# Patient Record
Sex: FEMALE | Race: OTHER | NOT HISPANIC OR LATINO
[De-identification: names, ages, dates, MRNs, and addresses within clinical notes are randomized per-mention and may not be internally consistent; named-entity substitution may affect disease eponyms.]

---

## 2018-02-01 ENCOUNTER — APPOINTMENT (OUTPATIENT)
Dept: MAMMOGRAPHY | Facility: IMAGING CENTER | Age: 58
End: 2018-02-01

## 2018-10-16 ENCOUNTER — INPATIENT (INPATIENT)
Facility: HOSPITAL | Age: 58
LOS: 1 days | Discharge: HOME | End: 2018-10-18
Attending: INTERNAL MEDICINE | Admitting: INTERNAL MEDICINE
Payer: COMMERCIAL

## 2018-10-16 VITALS
OXYGEN SATURATION: 100 % | RESPIRATION RATE: 18 BRPM | TEMPERATURE: 98 F | SYSTOLIC BLOOD PRESSURE: 180 MMHG | DIASTOLIC BLOOD PRESSURE: 80 MMHG | HEART RATE: 103 BPM

## 2018-10-16 DIAGNOSIS — E11.9 TYPE 2 DIABETES MELLITUS WITHOUT COMPLICATIONS: ICD-10-CM

## 2018-10-16 DIAGNOSIS — R07.9 CHEST PAIN, UNSPECIFIED: ICD-10-CM

## 2018-10-16 DIAGNOSIS — Z98.89 OTHER SPECIFIED POSTPROCEDURAL STATES: Chronic | ICD-10-CM

## 2018-10-16 DIAGNOSIS — E78.00 PURE HYPERCHOLESTEROLEMIA, UNSPECIFIED: ICD-10-CM

## 2018-10-16 DIAGNOSIS — Z29.9 ENCOUNTER FOR PROPHYLACTIC MEASURES, UNSPECIFIED: ICD-10-CM

## 2018-10-16 DIAGNOSIS — I10 ESSENTIAL (PRIMARY) HYPERTENSION: ICD-10-CM

## 2018-10-16 DIAGNOSIS — E03.9 HYPOTHYROIDISM, UNSPECIFIED: ICD-10-CM

## 2018-10-16 LAB
ALBUMIN SERPL ELPH-MCNC: 4.6 G/DL — SIGNIFICANT CHANGE UP (ref 3.3–5)
ALP SERPL-CCNC: 87 U/L — SIGNIFICANT CHANGE UP (ref 40–120)
ALT FLD-CCNC: 11 U/L — SIGNIFICANT CHANGE UP (ref 4–33)
APTT BLD: 25.5 SEC — LOW (ref 27.5–37.4)
AST SERPL-CCNC: 20 U/L — SIGNIFICANT CHANGE UP (ref 4–32)
BASOPHILS # BLD AUTO: 0.03 K/UL — SIGNIFICANT CHANGE UP (ref 0–0.2)
BASOPHILS NFR BLD AUTO: 0.3 % — SIGNIFICANT CHANGE UP (ref 0–2)
BILIRUB SERPL-MCNC: < 0.2 MG/DL — LOW (ref 0.2–1.2)
BUN SERPL-MCNC: 15 MG/DL — SIGNIFICANT CHANGE UP (ref 7–23)
CALCIUM SERPL-MCNC: 9.6 MG/DL — SIGNIFICANT CHANGE UP (ref 8.4–10.5)
CHLORIDE SERPL-SCNC: 101 MMOL/L — SIGNIFICANT CHANGE UP (ref 98–107)
CK MB BLD-MCNC: 2.32 NG/ML — SIGNIFICANT CHANGE UP (ref 1–4.7)
CK SERPL-CCNC: 132 U/L — SIGNIFICANT CHANGE UP (ref 25–170)
CO2 SERPL-SCNC: 23 MMOL/L — SIGNIFICANT CHANGE UP (ref 22–31)
CREAT SERPL-MCNC: 0.63 MG/DL — SIGNIFICANT CHANGE UP (ref 0.5–1.3)
D DIMER BLD IA.RAPID-MCNC: 396 NG/ML — SIGNIFICANT CHANGE UP
EOSINOPHIL # BLD AUTO: 0.34 K/UL — SIGNIFICANT CHANGE UP (ref 0–0.5)
EOSINOPHIL NFR BLD AUTO: 2.9 % — SIGNIFICANT CHANGE UP (ref 0–6)
GLUCOSE SERPL-MCNC: 162 MG/DL — HIGH (ref 70–99)
HCT VFR BLD CALC: 36.8 % — SIGNIFICANT CHANGE UP (ref 34.5–45)
HGB BLD-MCNC: 11.3 G/DL — LOW (ref 11.5–15.5)
IMM GRANULOCYTES # BLD AUTO: 0.04 # — SIGNIFICANT CHANGE UP
IMM GRANULOCYTES NFR BLD AUTO: 0.3 % — SIGNIFICANT CHANGE UP (ref 0–1.5)
INR BLD: 1.2 — HIGH (ref 0.88–1.17)
LYMPHOCYTES # BLD AUTO: 3.58 K/UL — HIGH (ref 1–3.3)
LYMPHOCYTES # BLD AUTO: 30.2 % — SIGNIFICANT CHANGE UP (ref 13–44)
MCHC RBC-ENTMCNC: 25.4 PG — LOW (ref 27–34)
MCHC RBC-ENTMCNC: 30.7 % — LOW (ref 32–36)
MCV RBC AUTO: 82.7 FL — SIGNIFICANT CHANGE UP (ref 80–100)
MONOCYTES # BLD AUTO: 0.6 K/UL — SIGNIFICANT CHANGE UP (ref 0–0.9)
MONOCYTES NFR BLD AUTO: 5.1 % — SIGNIFICANT CHANGE UP (ref 2–14)
NEUTROPHILS # BLD AUTO: 7.26 K/UL — SIGNIFICANT CHANGE UP (ref 1.8–7.4)
NEUTROPHILS NFR BLD AUTO: 61.2 % — SIGNIFICANT CHANGE UP (ref 43–77)
NRBC # FLD: 0 — SIGNIFICANT CHANGE UP
PLATELET # BLD AUTO: 286 K/UL — SIGNIFICANT CHANGE UP (ref 150–400)
PMV BLD: 12.2 FL — SIGNIFICANT CHANGE UP (ref 7–13)
POTASSIUM SERPL-MCNC: 4.4 MMOL/L — SIGNIFICANT CHANGE UP (ref 3.5–5.3)
POTASSIUM SERPL-SCNC: 4.4 MMOL/L — SIGNIFICANT CHANGE UP (ref 3.5–5.3)
PROT SERPL-MCNC: 8.2 G/DL — SIGNIFICANT CHANGE UP (ref 6–8.3)
PROTHROM AB SERPL-ACNC: 13.9 SEC — HIGH (ref 9.8–13.1)
RBC # BLD: 4.45 M/UL — SIGNIFICANT CHANGE UP (ref 3.8–5.2)
RBC # FLD: 14.1 % — SIGNIFICANT CHANGE UP (ref 10.3–14.5)
SODIUM SERPL-SCNC: 140 MMOL/L — SIGNIFICANT CHANGE UP (ref 135–145)
TROPONIN T, HIGH SENSITIVITY: < 6 NG/L — SIGNIFICANT CHANGE UP (ref ?–14)
WBC # BLD: 11.85 K/UL — HIGH (ref 3.8–10.5)
WBC # FLD AUTO: 11.85 K/UL — HIGH (ref 3.8–10.5)

## 2018-10-16 PROCEDURE — 71046 X-RAY EXAM CHEST 2 VIEWS: CPT | Mod: 26

## 2018-10-16 RX ORDER — INSULIN LISPRO 100/ML
VIAL (ML) SUBCUTANEOUS AT BEDTIME
Qty: 0 | Refills: 0 | Status: DISCONTINUED | OUTPATIENT
Start: 2018-10-16 | End: 2018-10-18

## 2018-10-16 RX ORDER — DEXTROSE 50 % IN WATER 50 %
25 SYRINGE (ML) INTRAVENOUS ONCE
Qty: 0 | Refills: 0 | Status: DISCONTINUED | OUTPATIENT
Start: 2018-10-16 | End: 2018-10-18

## 2018-10-16 RX ORDER — LEVOTHYROXINE SODIUM 125 MCG
75 TABLET ORAL DAILY
Qty: 0 | Refills: 0 | Status: DISCONTINUED | OUTPATIENT
Start: 2018-10-16 | End: 2018-10-18

## 2018-10-16 RX ORDER — DEXTROSE 50 % IN WATER 50 %
12.5 SYRINGE (ML) INTRAVENOUS ONCE
Qty: 0 | Refills: 0 | Status: DISCONTINUED | OUTPATIENT
Start: 2018-10-16 | End: 2018-10-18

## 2018-10-16 RX ORDER — ASPIRIN/CALCIUM CARB/MAGNESIUM 324 MG
81 TABLET ORAL DAILY
Qty: 0 | Refills: 0 | Status: DISCONTINUED | OUTPATIENT
Start: 2018-10-16 | End: 2018-10-18

## 2018-10-16 RX ORDER — LOSARTAN POTASSIUM 100 MG/1
25 TABLET, FILM COATED ORAL DAILY
Qty: 0 | Refills: 0 | Status: DISCONTINUED | OUTPATIENT
Start: 2018-10-16 | End: 2018-10-18

## 2018-10-16 RX ORDER — HEPARIN SODIUM 5000 [USP'U]/ML
5000 INJECTION INTRAVENOUS; SUBCUTANEOUS EVERY 12 HOURS
Qty: 0 | Refills: 0 | Status: DISCONTINUED | OUTPATIENT
Start: 2018-10-16 | End: 2018-10-18

## 2018-10-16 RX ORDER — DEXTROSE 50 % IN WATER 50 %
15 SYRINGE (ML) INTRAVENOUS ONCE
Qty: 0 | Refills: 0 | Status: DISCONTINUED | OUTPATIENT
Start: 2018-10-16 | End: 2018-10-18

## 2018-10-16 RX ORDER — INSULIN LISPRO 100/ML
VIAL (ML) SUBCUTANEOUS
Qty: 0 | Refills: 0 | Status: DISCONTINUED | OUTPATIENT
Start: 2018-10-16 | End: 2018-10-18

## 2018-10-16 RX ORDER — SODIUM CHLORIDE 9 MG/ML
1000 INJECTION, SOLUTION INTRAVENOUS ONCE
Qty: 0 | Refills: 0 | Status: COMPLETED | OUTPATIENT
Start: 2018-10-16 | End: 2018-10-16

## 2018-10-16 RX ORDER — ASPIRIN/CALCIUM CARB/MAGNESIUM 324 MG
162 TABLET ORAL ONCE
Qty: 0 | Refills: 0 | Status: COMPLETED | OUTPATIENT
Start: 2018-10-16 | End: 2018-10-16

## 2018-10-16 RX ORDER — GLUCAGON INJECTION, SOLUTION 0.5 MG/.1ML
1 INJECTION, SOLUTION SUBCUTANEOUS ONCE
Qty: 0 | Refills: 0 | Status: DISCONTINUED | OUTPATIENT
Start: 2018-10-16 | End: 2018-10-18

## 2018-10-16 RX ORDER — ATORVASTATIN CALCIUM 80 MG/1
10 TABLET, FILM COATED ORAL AT BEDTIME
Qty: 0 | Refills: 0 | Status: DISCONTINUED | OUTPATIENT
Start: 2018-10-16 | End: 2018-10-18

## 2018-10-16 RX ORDER — INSULIN GLARGINE 100 [IU]/ML
20 INJECTION, SOLUTION SUBCUTANEOUS AT BEDTIME
Qty: 0 | Refills: 0 | Status: DISCONTINUED | OUTPATIENT
Start: 2018-10-16 | End: 2018-10-18

## 2018-10-16 RX ORDER — ATENOLOL 25 MG/1
25 TABLET ORAL DAILY
Qty: 0 | Refills: 0 | Status: DISCONTINUED | OUTPATIENT
Start: 2018-10-16 | End: 2018-10-18

## 2018-10-16 RX ORDER — SODIUM CHLORIDE 9 MG/ML
1000 INJECTION, SOLUTION INTRAVENOUS
Qty: 0 | Refills: 0 | Status: DISCONTINUED | OUTPATIENT
Start: 2018-10-16 | End: 2018-10-18

## 2018-10-16 RX ORDER — FAMOTIDINE 10 MG/ML
40 INJECTION INTRAVENOUS DAILY
Qty: 0 | Refills: 0 | Status: DISCONTINUED | OUTPATIENT
Start: 2018-10-16 | End: 2018-10-18

## 2018-10-16 RX ADMIN — SODIUM CHLORIDE 1000 MILLILITER(S): 9 INJECTION, SOLUTION INTRAVENOUS at 22:07

## 2018-10-16 RX ADMIN — LOSARTAN POTASSIUM 25 MILLIGRAM(S): 100 TABLET, FILM COATED ORAL at 23:31

## 2018-10-16 RX ADMIN — ATORVASTATIN CALCIUM 10 MILLIGRAM(S): 80 TABLET, FILM COATED ORAL at 23:31

## 2018-10-16 RX ADMIN — ATENOLOL 25 MILLIGRAM(S): 25 TABLET ORAL at 23:31

## 2018-10-16 RX ADMIN — Medication 162 MILLIGRAM(S): at 19:08

## 2018-10-16 RX ADMIN — SODIUM CHLORIDE 1000 MILLILITER(S): 9 INJECTION, SOLUTION INTRAVENOUS at 19:09

## 2018-10-16 NOTE — ED PROVIDER NOTE - PHYSICAL EXAMINATION
gen: well appearing  Mentation: AAO x 3  psych: mood appropriate  ENT: airway patent  Eyes: conjunctivae clear bilaterally  Cardio: RRR, no m/r/g  Resp: normal BS b/l  GI: s/nt/nd   Neuro: AAO x 3, sensation and motor function intact  MSK: normal movement of all extremities

## 2018-10-16 NOTE — H&P ADULT - HISTORY OF PRESENT ILLNESS
59 y/o F with h/o arthritis, DM, HTN, HLD, hypothyroidism, PVD presents to the ED for chest pain X 2-3 days. Pt states chest pain is constant, left sided radiating to the left shoulder, nonexertional, non positional. Pt states the chest pain usually started as a cramping sensation, and now it is a dull sensation. Pt also states the chest pain was initially pleuritic and now it is nonpleuritic. Pt reports drinking 3 cup of tea a day. Pt also has recently had two death in the family and work stress as well. Pt states she went to her PCP office today and did an EKG which was found to be abnormal and was sent here for further evaluation. Pt states last stress test was 3 years ago with normal results. Pt denies LOC, syncope, fever, chills, N/V/D/C, palpitations, shortness of breath, dizziness, lightheadedness dysuria, urinary/bowel incontinence or any other complaints at this time.

## 2018-10-16 NOTE — H&P ADULT - NSHPPHYSICALEXAM_GEN_ALL_CORE
GENERAL APPEARANCE: Well developed, well nourished, alert and cooperative, and appears to be in no acute distress.  HEAD: normocephalic.  EYES: PERRL, EOMI.   EARS: External auditory canals clear, hearing grossly intact.  NECK: Neck supple, non-tender without lymphadenopathy, masses or thyromegaly.  CARDIAC: Normal S1 and S2. No S3, S4 or murmurs. Rhythm is regular.   LUNGS: Clear to auscultation without rales, rhonchi, wheezing or diminished breath sounds.  ABDOMEN: Positive bowel sounds. Soft, nondistended, nontender. No guarding or rebound. No masses.  EXTREMITIES: No significant deformity or joint abnormality. No edema. Peripheral pulses intact.   SKIN: Skin normal color, texture and turgor with no lesions or eruptions.  PSYCHIATRIC: The mental examination revealed the patient was oriented to person, place, and time. The patient was able to demonstrate good judgement and reason, without hallucinations, abnormal affect or abnormal behaviors during the examination. Patient is not suicidal.

## 2018-10-16 NOTE — ED ADULT NURSE NOTE - FAMILY HISTORY
Mother  Still living? No  Family history of myocardial infarction at age less than 60, Age at diagnosis: Age Unknown     Father  Still living? No  Family history of CVA, Age at diagnosis: Age Unknown     Sibling  Still living? No  Family history of myocardial infarction at age less than 60, Age at diagnosis: Age Unknown

## 2018-10-16 NOTE — ED ADULT NURSE NOTE - PMH
Arthritis    Chronic Osteoarthritis    Diabetes Mellitus  x 1 year with neuropathy  HTN (Hypertension)    Hypercholesterolemia    Hypothyroidism    PVD (Peripheral Vascular Disease)

## 2018-10-16 NOTE — H&P ADULT - PROBLEM SELECTOR PLAN 2
Routine blood glucose check.   Stop PO medications, continue with lantus and start sliding scale.   Check hemoglobin a1c.   Low carb diet

## 2018-10-16 NOTE — H&P ADULT - FAMILY HISTORY
Family history of myocardial infarction at age less than 60, mother  at 45 from MI     Family history of CVA, father  at 57 y/o     Sibling  Still living? No  Family history of myocardial infarction at age less than 60, Age at diagnosis: Age Unknown

## 2018-10-16 NOTE — H&P ADULT - PROBLEM SELECTOR PLAN 1
Admit to telemetry.   Trend CE. EKG PRN chest pain.   Plan for cath in AM. NPO after midnight.   check cbc, tsh, lipid, hemoglobin a1c, bmp with mag and phos.  f/u MD note

## 2018-10-16 NOTE — ED ADULT NURSE NOTE - OBJECTIVE STATEMENT
Patient brought into room 2. A&o x 4, clear, equal, unlabored, respirations. Sent by cardiologist for abnormal EKG. C./o chest pain staring 3 days ago with a cramping feeling followed by sharp pain. Patient placed on cardiac monitor.  No edema, JVD, headache, SOB. 20g iv placed in right AC. Labs sent. MD at bedside. Awaiting further orders.

## 2018-10-16 NOTE — ED PROVIDER NOTE - ATTENDING CONTRIBUTION TO CARE
58F c/o chest pain.  EKG – SR at 98 no ana maría no std, TWI V2.  L sided chest pain rad to L arm. Went to PMD Dr Chatterjee, did EKG, was abnl and sent pt here.  Pt to see Dr Feli Nogueira, plan to do angiography.  Last meal 1PM.  No sweating, no SOB.  Was shaking yesterday.   No cough, no fever, no dysuria. No abd pain, no vomiting.  Constant pain.  No change with exertion.  Started as a cramp in her L chest. Initially worse with deep breath.  Took ASA at that time which helped some.  No leg swelling or pain.  General weakness L side.  No rash.  Had ST 3 years ago which was fine.  No h/o DVT/PE.  pMHX – DM on pills and insulin.  HTN, hypothyroid, HLD  PSHX – knee surg 2ya, BTL Meds – metformin bid, insulin, losartan, atenolol, zocor, ASA.  No T.  All – celebrex, aloe vera, yeast.  D/w PMD Shena who wants pt admitted for possible cath tmw.  Rx ASA, check labs, CXR, CE, dimer, admit.   VS:  unremarkable    GEN - NAD; malaise; A+O x3   HEAD - NC/AT     ENT - PEERL, EOMI, mucous membranes  moist , no discharge      NECK: Neck supple, non-tender without lymphadenopathy, no masses, no JVD  PULM - CTA b/l,  symmetric breath sounds  COR -  normal heart sounds    ABD - , ND, NT, soft,  BACK - no CVA tenderness, nontender spine     EXTREMS - no edema, no deformity, warm and well perfused    SKIN - no rash or bruising      NEUROLOGIC - alert, CN 2-12 intact, sensation nl, motor no focal deficit.

## 2018-10-16 NOTE — H&P ADULT - ASSESSMENT
59 y/o F with h/o arthritis, DM, HTN, HLD, hypothyroidism, PVD presents to the ED for L sided chest pain. Admit to telemetry for cath in AM.

## 2018-10-16 NOTE — H&P ADULT - NSHPLABSRESULTS_GEN_ALL_CORE
LABS:                        11.3   11.85 )-----------( 286      ( 16 Oct 2018 18:40 )             36.8     10-16    140  |  101  |  15  ----------------------------<  162<H>  4.4   |  23  |  0.63    Ca    9.6      16 Oct 2018 18:40    TPro  8.2  /  Alb  4.6  /  TBili  < 0.2<L>  /  DBili  x   /  AST  20  /  ALT  11  /  AlkPhos  87  10-16    PT/INR - ( 16 Oct 2018 18:40 )   PT: 13.9 SEC;   INR: 1.20          PTT - ( 16 Oct 2018 18:40 )  PTT:25.5 SEC    CAPILLARY BLOOD GLUCOSE    EKG shows NSR @ 98 bpm TWI in V1,V2  ms

## 2018-10-17 ENCOUNTER — TRANSCRIPTION ENCOUNTER (OUTPATIENT)
Age: 58
End: 2018-10-17

## 2018-10-17 LAB
BUN SERPL-MCNC: 15 MG/DL — SIGNIFICANT CHANGE UP (ref 7–23)
CALCIUM SERPL-MCNC: 8.8 MG/DL — SIGNIFICANT CHANGE UP (ref 8.4–10.5)
CHLORIDE SERPL-SCNC: 106 MMOL/L — SIGNIFICANT CHANGE UP (ref 98–107)
CHOLEST SERPL-MCNC: 156 MG/DL — SIGNIFICANT CHANGE UP (ref 120–199)
CK MB BLD-MCNC: 1.74 NG/ML — SIGNIFICANT CHANGE UP (ref 1–4.7)
CK SERPL-CCNC: 86 U/L — SIGNIFICANT CHANGE UP (ref 25–170)
CO2 SERPL-SCNC: 24 MMOL/L — SIGNIFICANT CHANGE UP (ref 22–31)
CREAT SERPL-MCNC: 0.62 MG/DL — SIGNIFICANT CHANGE UP (ref 0.5–1.3)
GLUCOSE SERPL-MCNC: 110 MG/DL — HIGH (ref 70–99)
HBA1C BLD-MCNC: 7.4 % — HIGH (ref 4–5.6)
HCT VFR BLD CALC: 35.8 % — SIGNIFICANT CHANGE UP (ref 34.5–45)
HDLC SERPL-MCNC: 44 MG/DL — LOW (ref 45–65)
HGB BLD-MCNC: 11.1 G/DL — LOW (ref 11.5–15.5)
LIPID PNL WITH DIRECT LDL SERPL: 94 MG/DL — SIGNIFICANT CHANGE UP
MAGNESIUM SERPL-MCNC: 2 MG/DL — SIGNIFICANT CHANGE UP (ref 1.6–2.6)
MCHC RBC-ENTMCNC: 25.3 PG — LOW (ref 27–34)
MCHC RBC-ENTMCNC: 31 % — LOW (ref 32–36)
MCV RBC AUTO: 81.5 FL — SIGNIFICANT CHANGE UP (ref 80–100)
NRBC # FLD: 0 — SIGNIFICANT CHANGE UP
PHOSPHATE SERPL-MCNC: 3.2 MG/DL — SIGNIFICANT CHANGE UP (ref 2.5–4.5)
PLATELET # BLD AUTO: 261 K/UL — SIGNIFICANT CHANGE UP (ref 150–400)
PMV BLD: 11.5 FL — SIGNIFICANT CHANGE UP (ref 7–13)
POTASSIUM SERPL-MCNC: 4.2 MMOL/L — SIGNIFICANT CHANGE UP (ref 3.5–5.3)
POTASSIUM SERPL-SCNC: 4.2 MMOL/L — SIGNIFICANT CHANGE UP (ref 3.5–5.3)
RBC # BLD: 4.39 M/UL — SIGNIFICANT CHANGE UP (ref 3.8–5.2)
RBC # FLD: 14.2 % — SIGNIFICANT CHANGE UP (ref 10.3–14.5)
SODIUM SERPL-SCNC: 141 MMOL/L — SIGNIFICANT CHANGE UP (ref 135–145)
TRIGL SERPL-MCNC: 142 MG/DL — SIGNIFICANT CHANGE UP (ref 10–149)
TROPONIN T, HIGH SENSITIVITY: < 6 NG/L — SIGNIFICANT CHANGE UP (ref ?–14)
TSH SERPL-MCNC: 4.05 UIU/ML — SIGNIFICANT CHANGE UP (ref 0.27–4.2)
WBC # BLD: 7.97 K/UL — SIGNIFICANT CHANGE UP (ref 3.8–10.5)
WBC # FLD AUTO: 7.97 K/UL — SIGNIFICANT CHANGE UP (ref 3.8–10.5)

## 2018-10-17 PROCEDURE — 93454 CORONARY ARTERY ANGIO S&I: CPT | Mod: 26

## 2018-10-17 PROCEDURE — 99152 MOD SED SAME PHYS/QHP 5/>YRS: CPT

## 2018-10-17 RX ORDER — ACETAMINOPHEN 500 MG
650 TABLET ORAL ONCE
Qty: 0 | Refills: 0 | Status: COMPLETED | OUTPATIENT
Start: 2018-10-17 | End: 2018-10-17

## 2018-10-17 RX ADMIN — Medication 650 MILLIGRAM(S): at 23:23

## 2018-10-17 RX ADMIN — FAMOTIDINE 40 MILLIGRAM(S): 10 INJECTION INTRAVENOUS at 13:13

## 2018-10-17 RX ADMIN — Medication 81 MILLIGRAM(S): at 13:13

## 2018-10-17 RX ADMIN — ATORVASTATIN CALCIUM 10 MILLIGRAM(S): 80 TABLET, FILM COATED ORAL at 21:22

## 2018-10-17 RX ADMIN — LOSARTAN POTASSIUM 25 MILLIGRAM(S): 100 TABLET, FILM COATED ORAL at 21:22

## 2018-10-17 RX ADMIN — Medication 650 MILLIGRAM(S): at 22:23

## 2018-10-17 RX ADMIN — Medication 75 MICROGRAM(S): at 05:58

## 2018-10-17 RX ADMIN — HEPARIN SODIUM 5000 UNIT(S): 5000 INJECTION INTRAVENOUS; SUBCUTANEOUS at 05:58

## 2018-10-17 RX ADMIN — ATENOLOL 25 MILLIGRAM(S): 25 TABLET ORAL at 21:22

## 2018-10-17 RX ADMIN — INSULIN GLARGINE 20 UNIT(S): 100 INJECTION, SOLUTION SUBCUTANEOUS at 00:30

## 2018-10-17 RX ADMIN — Medication 1: at 22:03

## 2018-10-17 RX ADMIN — INSULIN GLARGINE 20 UNIT(S): 100 INJECTION, SOLUTION SUBCUTANEOUS at 22:00

## 2018-10-17 NOTE — DISCHARGE NOTE ADULT - MEDICATION SUMMARY - MEDICATIONS TO TAKE
I will START or STAY ON the medications listed below when I get home from the hospital:    aspirin 81 mg oral tablet  -- 1 tab(s) by mouth once a day  -- Indication: For Cardioprotective    losartan 25 mg oral tablet  -- 1 tab(s) by mouth once a day  -- Indication: For HTN (Hypertension)    Basaglar KwikPen 100 units/mL subcutaneous solution  -- 30 unit(s) subcutaneous once a day (at bedtime)  -- Indication: For Diabetes Mellitus    metFORMIN 1000 mg oral tablet  -- 1 tab(s) by mouth 2 times a day, REstart ONLY  on 10/20  -- Indication: For Diabetes Mellitus    atorvastatin 10 mg oral tablet  -- 1 tab(s) by mouth once a day  -- Indication: For Cholesterol    atenolol 25 mg oral tablet  -- 1 tab(s) by mouth once a day  -- Indication: For HTN (Hypertension)    famotidine 40 mg oral tablet  -- 1 tab(s) by mouth once a day  -- Indication: For GERD    levothyroxine 75 mcg (0.075 mg) oral tablet  -- 1 tab(s) by mouth once a day  -- Indication: For Hypothyroidism

## 2018-10-17 NOTE — DISCHARGE NOTE ADULT - PATIENT PORTAL LINK FT
You can access the [facility] Patient Portal, offered by [facility], by registering with the following website: [email or web address]

## 2018-10-17 NOTE — DISCHARGE NOTE ADULT - CARE PROVIDER_API CALL
Lisa Chatterjee), Internal Medicine  [address] [place] [address]  Phone: [phone]  Fax: [phone]

## 2018-10-17 NOTE — DISCHARGE NOTE ADULT - CARE PLAN
Principal Discharge DX:	Chest pain  Goal:	REsolved  Assessment and plan of treatment:	Follow up with Dr. Chatterjee  Secondary Diagnosis:	Diabetes Mellitus  Goal:	To maintain a normal blood glucose level and HgA1C level < 5.7 and to prevent diabetic complications such as uncontrolled diabetes, diabetic coma, blindness, renal failure, and amputations.  Assessment and plan of treatment:	Monitor finger sticks pre-meal and bedtime, low salt, fat and carbohydrate diet, minimize glucose intake.  Exercise daily for at least 30 minutes and weight loss.  Follow up with primary care physician and endocrinologist for routine Hemoglobin A1C checks and management.  Follow up with your ophthalmologist for routine yearly vision exams.  Secondary Diagnosis:	HTN (Hypertension)  Goal:	To maintain a normal blood pressure to prevent heart attack, stroke and renal failure.  Assessment and plan of treatment:	Low sodium and fat diet, continue anti-hypertensive medications, and follow up with primary care physician.  Secondary Diagnosis:	Hypothyroidism  Goal:	To maintain a normal TSH Level.  Secondary Diagnosis:	Hypercholesterolemia  Goal:	To maintain normal cholesterol levels to prevent stroke, coronary artery disease, peripheral vascular disease and heart attacks.  Assessment and plan of treatment:	Low fat diet, exercise daily and continue current medications. Follow up with primary care physician and cardiologist for management.

## 2018-10-17 NOTE — PROGRESS NOTE ADULT - PROBLEM SELECTOR PLAN 2
ISS  Stop PO medications, continue with lantus and start sliding scale.   Check hemoglobin a1c.   Low carb diet

## 2018-10-17 NOTE — DISCHARGE NOTE ADULT - HOSPITAL COURSE
58F with h/o arthritis, DM, HTN, HLD, hypothyroidism, PVD presents to the ED for chest pain X 2-3 days.  Negative hsTrop x 2  ECG with nonspecific findings  Plan for Cath: _______________________________    #HTN  -- continue atenolol  -- continue lisinopril    #HLD  -- continue atorvastatin 58F with h/o arthritis, DM, HTN, HLD, hypothyroidism, PVD presents to the ED for chest pain X 2-3 days.  Negative hsTrop x 2  ECG with nonspecific findings  Plan for Cath: Normal Coronaries    #HTN  -- continue atenolol  -- continue lisinopril    #HLD  -- continue atorvastatin  Case discussed with attending, Pt is stable for Discharge Home.

## 2018-10-17 NOTE — CONSULT NOTE ADULT - ASSESSMENT
58F with h/o arthritis, DM, HTN, HLD, hypothyroidism, PVD presents to the ED for chest pain X 2-3 days.  Negative hsTrop x 2  ECG with nonspecific findings    #Atypical chest pain  -- continue asa, atorvastatin  -- check TTE  -- would proceed with nuclear stress test    #HTN  -- continue atenolol  -- continue lisinopril    #HLD  -- continue atorvastatin    Zeke Woodward MD

## 2018-10-17 NOTE — CONSULT NOTE ADULT - SUBJECTIVE AND OBJECTIVE BOX
Patient seen and evaluated @ 9:45 AM  Chief Complaint: Chest pain     HPI:  58F with h/o arthritis, DM, HTN, HLD, hypothyroidism, PVD presents to the ED for chest pain X 2-3 days. Pt states chest pain is constant, left sided radiating to the left shoulder, nonexertional, non positional. Pt states the chest pain usually started as a cramping sensation, and now it is a dull sensation. Pt also states the chest pain was initially pleuritic and now it is nonpleuritic. Pt reports drinking 3 cup of tea a day. Pt also has recently had two death in the family and work stress as well. Pt states she went to her PCP office today and did an EKG which was found to be abnormal and was sent here for further evaluation. Pt states last stress test was 3 years ago with normal results. Pt denies LOC, syncope, fever, chills, N/V/D/C, palpitations, shortness of breath, dizziness, lightheadedness dysuria, urinary/bowel incontinence or any other complaints at this time.    Trop < 6 x 2. No significant ischemic changes on ECG.    Prior cath in , normal cors but concern for Takotsubo's.    PMH:   Arthritis  Chronic Osteoarthritis  Hypothyroidism  Diabetes Mellitus  Hypercholesterolemia  HTN (Hypertension)  PVD (Peripheral Vascular Disease)    PSH:   H/O  section  Tubal Ligation Status  Knee Replacement    Medications:   aspirin  chewable 81 milliGRAM(s) Oral daily  ATENolol  Tablet 25 milliGRAM(s) Oral daily  atorvastatin 10 milliGRAM(s) Oral at bedtime  dextrose 40% Gel 15 Gram(s) Oral once PRN  dextrose 5%. 1000 milliLiter(s) IV Continuous <Continuous>  dextrose 50% Injectable 12.5 Gram(s) IV Push once  dextrose 50% Injectable 25 Gram(s) IV Push once  dextrose 50% Injectable 25 Gram(s) IV Push once  famotidine    Tablet 40 milliGRAM(s) Oral daily  glucagon  Injectable 1 milliGRAM(s) IntraMuscular once PRN  heparin  Injectable 5000 Unit(s) SubCutaneous every 12 hours  insulin glargine Injectable (LANTUS) 20 Unit(s) SubCutaneous at bedtime  insulin lispro (HumaLOG) corrective regimen sliding scale   SubCutaneous three times a day before meals  insulin lispro (HumaLOG) corrective regimen sliding scale   SubCutaneous at bedtime  levothyroxine 75 MICROGram(s) Oral daily  losartan 25 milliGRAM(s) Oral daily    Allergies:  adhesives (Rash)  aloe vera (Swelling)  baking soda (Hives)  Celebrex (Swelling)  sulfa drugs (Unknown)    FAMILY HISTORY:  Family history of myocardial infarction at age less than 60 (Sibling): brother  at 56 y/o from MI  Family history of CVA: father  at 59 y/o  Family history of myocardial infarction at age less than 60: mother  at 45 from MI    Social History:  [place]    Review of Systems:  Constitutional: [ ] Fever [ ] Chills [ ] Fatigue [ ] Weight change   HEENT: [ ] Blurred vision [ ] Eye Pain [ ] Headache [ ] Runny nose [ ] Sore Throat   Respiratory: [ ] Cough [ ] Wheezing [ ] Shortness of breath  Cardiovascular: [ ] Chest Pain [ ] Palpitations [ ] VAUGHN [ ] PND [ ] Orthopnea  Gastrointestinal: [ ] Abdominal Pain [ ] Diarrhea [ ] Constipation [ ] Hemorrhoids [ ] Nausea [ ] Vomiting  Genitourinary: [ ] Nocturia [ ] Dysuria [ ] Incontinence  Extremities: [ ] Swelling [ ] Joint Pain  Neurologic: [ ] Focal deficit [ ] Paresthesias [ ] Syncope  Lymphatic: [ ] Swelling [ ] Lymphadenopathy   Skin: [ ] Rash [ ] Ecchymoses [ ] Wounds [ ] Lesions  Psychiatry: [ ] Depression [ ] Suicidal/Homicidal Ideation [ ] Anxiety [ ] Sleep Disturbances  [x] 10 point review of systems is otherwise negative except as mentioned above            [ ]Unable to obtain    Physical Exam:  T(C): 36.7 (10-17-18 @ 05:57), Max: 36.8 (10-16-18 @ 16:20)  HR: 64 (10-17-18 @ 05:57) (64 - 103)  BP: 114/61 (10-17-18 @ 05:57) (114/61 - 180/80)  RR: 18 (10-17-18 @ 05:57) (16 - 18)  SpO2: 100% (10-17-18 @ 05:57) (99% - 100%)  Wt(kg): --    Daily Height in cm: 154.94 (16 Oct 2018 22:58)    Daily     Appearance: NAD  Eyes: PERRL, EOMI  HENT: Normal oral muscosa, NC/AT  Cardiovascular: normal S1 and S2, RRR, no m/r/g, no edema, normal JVP  Respiratory: Clear to auscultation bilaterally  Gastrointestinal: Soft, non-tender, non-distended, BS+  Musculoskeletal: No clubbing, no joint deformity   Neurologic: Non-focal  Lymphatic: No lymphadenopathy  Psychiatry: AAOx3, mood & affect appropriate  Skin: No rashes, no ecchymoses, no cyanosis    Cardiovascular Diagnostic Testing:  ECG: NSR, nonspecific ST-T wave changes    Echo:  TTE   Conclusions:  1. Endocardium not well visualized; grossly normal left  ventricular function.  Normal LV internal dimensions and  wall thicknesses.  Endocardial visualization enhanced with  intravenous injection of echo contrast (Definity).  2. Normal right ventricular size and systolic function.  3. Minimal mitral regurgitation.    Stress Testing:  none    Cath:    Ventricles: There was no diagnostic evidence of left ventricular regional  wall motion abnormalities. Analysis of regional contractile function  demonstrated severe anterolateral hypokinesis. EF estimated by contrast  ventriculography was 45 %. The left ventricle was normal in size.  Valves: Aortic valve: No significant aortic valve gradient. Mitral valve:  The mitral valve exhibited no regurgitation.  Coronary vessels: The coronary circulation is right dominant. There was no  angiographic evidence for occlusive coronary artery disease.  LM:      LM: The vessel was medium sized. Angiography showed no evidence of  disease.  LAD:      LAD: The vessel was medium to large sized. Angiography showed no  evidence of disease.  CX:      Circumflex: The vessel was medium sized. Angiography showed no  evidence of disease.  RCA:      RCA: The vessel was medium to large sized. Angiography showed no  evidence of disease.  Aorta: The root exhibited normal size.  Complications: There were no complications.  Summary:  Coronary circulation: There was no angiographic evidence for occlusive  coronary artery disease.  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    Interpretation of Telemetry: SR 60    Imaging:  CXR  IMPRESSION:   Clear lungs.    Labs:                        11.1   7.97  )-----------( 261      ( 17 Oct 2018 07:20 )             35.8     10-17    141  |  106  |  15  ----------------------------<  110<H>  4.2   |  24  |  0.62    Ca    8.8      17 Oct 2018 04:59  Phos  3.2     10-17  Mg     2.0     10-17    TPro  8.2  /  Alb  4.6  /  TBili  < 0.2<L>  /  DBili  x   /  AST  20  /  ALT  11  /  AlkPhos  87  10-16    PT/INR - ( 16 Oct 2018 18:40 )   PT: 13.9 SEC;   INR: 1.20          PTT - ( 16 Oct 2018 18:40 )  PTT:25.5 SEC  CARDIAC MARKERS ( 17 Oct 2018 04:59 )  x     / x     / 86 u/L / 1.74 ng/mL / x      CARDIAC MARKERS ( 16 Oct 2018 18:40 )  x     / x     / 132 u/L / 2.32 ng/mL / x            Total Cholesterol: 156  LDL: 94  HDL: 44  T    Hemoglobin A1C, Whole Blood: 7.4 % (10-17 @ 07:20)    Thyroid Stimulating Hormone, Serum: 4.05 uIU/mL (10-17 @ 04:59)

## 2018-10-17 NOTE — DISCHARGE NOTE ADULT - PLAN OF CARE
REsolved Follow up with Dr. Chatterjee To maintain a normal blood glucose level and HgA1C level < 5.7 and to prevent diabetic complications such as uncontrolled diabetes, diabetic coma, blindness, renal failure, and amputations. Monitor finger sticks pre-meal and bedtime, low salt, fat and carbohydrate diet, minimize glucose intake.  Exercise daily for at least 30 minutes and weight loss.  Follow up with primary care physician and endocrinologist for routine Hemoglobin A1C checks and management.  Follow up with your ophthalmologist for routine yearly vision exams. To maintain a normal blood pressure to prevent heart attack, stroke and renal failure. Low sodium and fat diet, continue anti-hypertensive medications, and follow up with primary care physician. To maintain a normal TSH Level. To maintain normal cholesterol levels to prevent stroke, coronary artery disease, peripheral vascular disease and heart attacks. Low fat diet, exercise daily and continue current medications. Follow up with primary care physician and cardiologist for management.

## 2018-10-18 VITALS
OXYGEN SATURATION: 98 % | TEMPERATURE: 98 F | RESPIRATION RATE: 16 BRPM | SYSTOLIC BLOOD PRESSURE: 132 MMHG | DIASTOLIC BLOOD PRESSURE: 77 MMHG | HEART RATE: 65 BPM

## 2018-10-18 LAB
BASOPHILS # BLD AUTO: 0.03 K/UL — SIGNIFICANT CHANGE UP (ref 0–0.2)
BASOPHILS NFR BLD AUTO: 0.4 % — SIGNIFICANT CHANGE UP (ref 0–2)
BUN SERPL-MCNC: 23 MG/DL — SIGNIFICANT CHANGE UP (ref 7–23)
CALCIUM SERPL-MCNC: 9 MG/DL — SIGNIFICANT CHANGE UP (ref 8.4–10.5)
CHLORIDE SERPL-SCNC: 103 MMOL/L — SIGNIFICANT CHANGE UP (ref 98–107)
CO2 SERPL-SCNC: 23 MMOL/L — SIGNIFICANT CHANGE UP (ref 22–31)
CREAT SERPL-MCNC: 0.68 MG/DL — SIGNIFICANT CHANGE UP (ref 0.5–1.3)
EOSINOPHIL # BLD AUTO: 0.32 K/UL — SIGNIFICANT CHANGE UP (ref 0–0.5)
EOSINOPHIL NFR BLD AUTO: 3.8 % — SIGNIFICANT CHANGE UP (ref 0–6)
GLUCOSE SERPL-MCNC: 127 MG/DL — HIGH (ref 70–99)
HCT VFR BLD CALC: 36.3 % — SIGNIFICANT CHANGE UP (ref 34.5–45)
HGB BLD-MCNC: 11.4 G/DL — LOW (ref 11.5–15.5)
IMM GRANULOCYTES # BLD AUTO: 0.01 # — SIGNIFICANT CHANGE UP
IMM GRANULOCYTES NFR BLD AUTO: 0.1 % — SIGNIFICANT CHANGE UP (ref 0–1.5)
LYMPHOCYTES # BLD AUTO: 3.93 K/UL — HIGH (ref 1–3.3)
LYMPHOCYTES # BLD AUTO: 46.1 % — HIGH (ref 13–44)
MAGNESIUM SERPL-MCNC: 2.1 MG/DL — SIGNIFICANT CHANGE UP (ref 1.6–2.6)
MCHC RBC-ENTMCNC: 25.8 PG — LOW (ref 27–34)
MCHC RBC-ENTMCNC: 31.4 % — LOW (ref 32–36)
MCV RBC AUTO: 82.1 FL — SIGNIFICANT CHANGE UP (ref 80–100)
MONOCYTES # BLD AUTO: 0.49 K/UL — SIGNIFICANT CHANGE UP (ref 0–0.9)
MONOCYTES NFR BLD AUTO: 5.7 % — SIGNIFICANT CHANGE UP (ref 2–14)
NEUTROPHILS # BLD AUTO: 3.75 K/UL — SIGNIFICANT CHANGE UP (ref 1.8–7.4)
NEUTROPHILS NFR BLD AUTO: 43.9 % — SIGNIFICANT CHANGE UP (ref 43–77)
NRBC # FLD: 0 — SIGNIFICANT CHANGE UP
PLATELET # BLD AUTO: 283 K/UL — SIGNIFICANT CHANGE UP (ref 150–400)
PMV BLD: 12.3 FL — SIGNIFICANT CHANGE UP (ref 7–13)
POTASSIUM SERPL-MCNC: 4.1 MMOL/L — SIGNIFICANT CHANGE UP (ref 3.5–5.3)
POTASSIUM SERPL-SCNC: 4.1 MMOL/L — SIGNIFICANT CHANGE UP (ref 3.5–5.3)
RBC # BLD: 4.42 M/UL — SIGNIFICANT CHANGE UP (ref 3.8–5.2)
RBC # FLD: 14.4 % — SIGNIFICANT CHANGE UP (ref 10.3–14.5)
SODIUM SERPL-SCNC: 139 MMOL/L — SIGNIFICANT CHANGE UP (ref 135–145)
WBC # BLD: 8.53 K/UL — SIGNIFICANT CHANGE UP (ref 3.8–10.5)
WBC # FLD AUTO: 8.53 K/UL — SIGNIFICANT CHANGE UP (ref 3.8–10.5)

## 2018-10-18 RX ORDER — ACETAMINOPHEN 500 MG
650 TABLET ORAL EVERY 6 HOURS
Qty: 0 | Refills: 0 | Status: DISCONTINUED | OUTPATIENT
Start: 2018-10-18 | End: 2018-10-18

## 2018-10-18 RX ADMIN — HEPARIN SODIUM 5000 UNIT(S): 5000 INJECTION INTRAVENOUS; SUBCUTANEOUS at 05:09

## 2018-10-18 RX ADMIN — Medication 1: at 13:33

## 2018-10-18 RX ADMIN — Medication 81 MILLIGRAM(S): at 11:14

## 2018-10-18 RX ADMIN — Medication 650 MILLIGRAM(S): at 11:54

## 2018-10-18 RX ADMIN — Medication 75 MICROGRAM(S): at 05:09

## 2018-10-18 RX ADMIN — Medication 650 MILLIGRAM(S): at 11:03

## 2018-10-18 RX ADMIN — FAMOTIDINE 40 MILLIGRAM(S): 10 INJECTION INTRAVENOUS at 11:10

## 2018-10-18 NOTE — PROGRESS NOTE ADULT - SUBJECTIVE AND OBJECTIVE BOX
Cardiology Progress Note    Interval events: S/P LHC with normal coronaries. LRA access site cdi, forearm swelling minimally tender. No ecchymoses.    Tele: SR    Medications:  aspirin  chewable 81 milliGRAM(s) Oral daily  ATENolol  Tablet 25 milliGRAM(s) Oral daily  atorvastatin 10 milliGRAM(s) Oral at bedtime  dextrose 40% Gel 15 Gram(s) Oral once PRN  dextrose 5%. 1000 milliLiter(s) IV Continuous <Continuous>  dextrose 50% Injectable 12.5 Gram(s) IV Push once  dextrose 50% Injectable 25 Gram(s) IV Push once  dextrose 50% Injectable 25 Gram(s) IV Push once  famotidine    Tablet 40 milliGRAM(s) Oral daily  glucagon  Injectable 1 milliGRAM(s) IntraMuscular once PRN  heparin  Injectable 5000 Unit(s) SubCutaneous every 12 hours  insulin glargine Injectable (LANTUS) 20 Unit(s) SubCutaneous at bedtime  insulin lispro (HumaLOG) corrective regimen sliding scale   SubCutaneous three times a day before meals  insulin lispro (HumaLOG) corrective regimen sliding scale   SubCutaneous at bedtime  levothyroxine 75 MICROGram(s) Oral daily  losartan 25 milliGRAM(s) Oral daily    Review of Systems:  Constitutional: [ ] Fever [ ] Chills [ ] Fatigue [ ] Weight change   HEENT: [ ] Blurred vision [ ] Eye Pain [ ] Headache [ ] Runny nose [ ] Sore Throat   Respiratory: [ ] Cough [ ] Wheezing [ ] Shortness of breath  Cardiovascular: [ ] Chest Pain [ ] Palpitations [ ] VAUGHN [ ] PND [ ] Orthopnea  Gastrointestinal: [ ] Abdominal Pain [ ] Diarrhea [ ] Constipation [ ] Hemorrhoids [ ] Nausea [ ] Vomiting  Genitourinary: [ ] Nocturia [ ] Dysuria [ ] Incontinence  Extremities: [ ] Swelling [ ] Joint Pain  Neurologic: [ ] Focal deficit [ ] Paresthesias [ ] Syncope  Lymphatic: [ ] Swelling [ ] Lymphadenopathy   Skin: [ ] Rash [ ] Ecchymoses [ ] Wounds [ ] Lesions  Psychiatry: [ ] Depression [ ] Suicidal/Homicidal Ideation [ ] Anxiety [ ] Sleep Disturbances  [x] 10 point review of systems is otherwise negative except as mentioned above            [ ]Unable to obtain    Vitals:  T(C): 36.8 (10-18-18 @ 05:07), Max: 36.8 (10-17-18 @ 15:07)  HR: 74 (10-18-18 @ 05:07) (69 - 74)  BP: 118/70 (10-18-18 @ 05:07) (118/70 - 148/87)  BP(mean): --  RR: 17 (10-18-18 @ 05:07) (16 - 18)  SpO2: 99% (10-18-18 @ 05:07) (98% - 99%)  Wt(kg): --  Daily     Daily   I&O's Summary      Physical Exam:  Appearance: NAD  Eyes: PERRL, EOMI  HENT: Normal oral muscosa, NC/AT  Cardiovascular: normal S1 and S2, RRR, no m/r/g, no edema, normal JVP  Respiratory: Clear to auscultation bilaterally  Gastrointestinal: Soft, non-tender, non-distended, BS+  Musculoskeletal: No clubbing, no joint deformity   Neurologic: Non-focal  Lymphatic: No lymphadenopathy  Psychiatry: AAOx3, mood & affect appropriate  Skin: No rashes, no ecchymoses, no cyanosis  Procedural site: LRA cdi, swelling of forearm, no ecchymoses, intact peripheral pulses and neuro exam    Labs:                        11.1   7.97  )-----------( 261      ( 17 Oct 2018 07:20 )             35.8     10-17    141  |  106  |  15  ----------------------------<  110<H>  4.2   |  24  |  0.62    Ca    8.8      17 Oct 2018 04:59  Phos  3.2     10-17  Mg     2.0     10-17    TPro  8.2  /  Alb  4.6  /  TBili  < 0.2<L>  /  DBili  x   /  AST  20  /  ALT  11  /  AlkPhos  87  10-16    PT/INR - ( 16 Oct 2018 18:40 )   PT: 13.9 SEC;   INR: 1.20          PTT - ( 16 Oct 2018 18:40 )  PTT:25.5 SEC  CARDIAC MARKERS ( 17 Oct 2018 04:59 )  x     / x     / 86 u/L / 1.74 ng/mL / x      CARDIAC MARKERS ( 16 Oct 2018 18:40 )  x     / x     / 132 u/L / 2.32 ng/mL / x        Hemoglobin A1C, Whole Blood: 7.4 % (10-17 @ 07:20)    New results/imaging:  Cath  VENTRICLES: No left ventriculogram was performed.  CORONARY VESSELS: The coronary circulation is right dominant.  LM:   --  LM: Normal.  LAD:   --  LAD: Angiography showed minor luminal irregularities with no  flow limiting lesions.  --  D1: Normal.  CX:   --  Circumflex: Normal.  RI:   --  Ramus intermedius: Normal.  RCA:   --  RCA: Normal.  COMPLICATIONS: There were no complications.  DIAGNOSTIC IMPRESSIONS: There is mild irregularity of the coronary anatomy.  DIAGNOSTIC RECOMMENDATIONS: Medical management  INTERVENTIONALRECOMMENDATIONS: Medical management
Patient is a 58y old  Female who presents with a chief complaint of chest pain (17 Oct 2018 14:36)      SUBJECTIVE / OVERNIGHT EVENTS:  No CP overnight. Trop negative  Review of Systems:   CONSTITUTIONAL: No fever, weight loss, or fatigue  EYES: No eye pain, visual disturbances, or discharge  ENMT:  No difficulty hearing, tinnitus, vertigo; No sinus or throat pain  NECK: No pain or stiffness  BREASTS: No pain, masses, or nipple discharge  RESPIRATORY: No cough, wheezing, chills or hemoptysis; No shortness of breath  CARDIOVASCULAR: No chest pain, palpitations, dizziness, or leg swelling  GASTROINTESTINAL: No abdominal or epigastric pain. No nausea, vomiting, or hematemesis; No diarrhea or constipation. No melena or hematochezia.  GENITOURINARY: No dysuria, frequency, hematuria, or incontinence  NEUROLOGICAL: No headaches, memory loss, loss of strength, numbness, or tremors  SKIN: No itching, burning, rashes, or lesions   LYMPH NODES: No enlarged glands  ENDOCRINE: No heat or cold intolerance; No hair loss  MUSCULOSKELETAL: No joint pain or swelling; No muscle, back, or extremity pain  PSYCHIATRIC: No depression, anxiety, mood swings, or difficulty sleeping  HEME/LYMPH: No easy bruising, or bleeding gums  ALLERY AND IMMUNOLOGIC: No hives or eczema    MEDICATIONS  (STANDING):  aspirin  chewable 81 milliGRAM(s) Oral daily  ATENolol  Tablet 25 milliGRAM(s) Oral daily  atorvastatin 10 milliGRAM(s) Oral at bedtime  dextrose 5%. 1000 milliLiter(s) (50 mL/Hr) IV Continuous <Continuous>  dextrose 50% Injectable 12.5 Gram(s) IV Push once  dextrose 50% Injectable 25 Gram(s) IV Push once  dextrose 50% Injectable 25 Gram(s) IV Push once  famotidine    Tablet 40 milliGRAM(s) Oral daily  heparin  Injectable 5000 Unit(s) SubCutaneous every 12 hours  insulin glargine Injectable (LANTUS) 20 Unit(s) SubCutaneous at bedtime  insulin lispro (HumaLOG) corrective regimen sliding scale   SubCutaneous three times a day before meals  insulin lispro (HumaLOG) corrective regimen sliding scale   SubCutaneous at bedtime  levothyroxine 75 MICROGram(s) Oral daily  losartan 25 milliGRAM(s) Oral daily    MEDICATIONS  (PRN):  dextrose 40% Gel 15 Gram(s) Oral once PRN Blood Glucose LESS THAN 70 milliGRAM(s)/deciliter  glucagon  Injectable 1 milliGRAM(s) IntraMuscular once PRN Glucose LESS THAN 70 milligrams/deciliter      PHYSICAL EXAM:  Vital Signs Last 24 Hrs  T(C): 36.8 (17 Oct 2018 15:07), Max: 36.8 (17 Oct 2018 15:07)  T(F): 98.2 (17 Oct 2018 15:07), Max: 98.2 (17 Oct 2018 15:07)  HR: 72 (17 Oct 2018 15:07) (64 - 73)  BP: 139/81 (17 Oct 2018 15:07) (114/61 - 158/81)  BP(mean): --  RR: 16 (17 Oct 2018 15:07) (16 - 18)  SpO2: 98% (17 Oct 2018 15:07) (98% - 100%)  I&O's Summary    GENERAL: NAD, well-developed  HEAD:  Atraumatic, Normocephalic  EYES: EOMI, PERRLA, conjunctiva and sclera clear  NECK: Supple, No JVD  CHEST/LUNG: Clear to auscultation bilaterally; No wheeze  HEART: Regular rate and rhythm; No murmurs, rubs, or gallops  ABDOMEN: Soft, Nontender, Nondistended; Bowel sounds present  EXTREMITIES:  2+ Peripheral Pulses, No clubbing, cyanosis, or edema  PSYCH: AAOx3  NEUROLOGY: non-focal  SKIN: No rashes or lesions    LABS:  CAPILLARY BLOOD GLUCOSE      POCT Blood Glucose.: 107 mg/dL (17 Oct 2018 19:27)  POCT Blood Glucose.: 79 mg/dL (17 Oct 2018 17:13)  POCT Blood Glucose.: 88 mg/dL (17 Oct 2018 13:10)  POCT Blood Glucose.: 100 mg/dL (17 Oct 2018 08:51)  POCT Blood Glucose.: 81 mg/dL (16 Oct 2018 22:13)                          11.1   7.97  )-----------( 261      ( 17 Oct 2018 07:20 )             35.8     10-17    141  |  106  |  15  ----------------------------<  110<H>  4.2   |  24  |  0.62    Ca    8.8      17 Oct 2018 04:59  Phos  3.2     10-17  Mg     2.0     10-17    TPro  8.2  /  Alb  4.6  /  TBili  < 0.2<L>  /  DBili  x   /  AST  20  /  ALT  11  /  AlkPhos  87  10-16    PT/INR - ( 16 Oct 2018 18:40 )   PT: 13.9 SEC;   INR: 1.20          PTT - ( 16 Oct 2018 18:40 )  PTT:25.5 SEC  CARDIAC MARKERS ( 17 Oct 2018 04:59 )  x     / x     / 86 u/L / 1.74 ng/mL / x      CARDIAC MARKERS ( 16 Oct 2018 18:40 )  x     / x     / 132 u/L / 2.32 ng/mL / x              RADIOLOGY & ADDITIONAL TESTS:    Imaging Personally Reviewed:    Consultant(s) Notes Reviewed:      Care Discussed with Consultants/Other Providers:

## 2018-10-18 NOTE — CHART NOTE - NSCHARTNOTEFT_GEN_A_CORE
Pt sleeping in bed, easily arousable,  s/p CATH today showing normal coronaries, LRA access     ICU Vital Signs Last 24 Hrs  T(C): 36.7 (17 Oct 2018 20:47), Max: 36.8 (17 Oct 2018 15:07)  T(F): 98 (17 Oct 2018 20:47), Max: 98.2 (17 Oct 2018 15:07)  HR: 69 (17 Oct 2018 20:47) (64 - 72)  BP: 148/87 (17 Oct 2018 20:47) (114/61 - 148/87)  BP(mean): --  ABP: --  ABP(mean): --  RR: 18 (17 Oct 2018 20:47) (16 - 18)  SpO2: 98% (17 Oct 2018 20:47) (98% - 100%)    Exam:  Left wrist with clean, dry bandage in place, no tenderness or swelling  Ringers warm to touch and good hand grasp  Continue to monitor

## 2018-10-18 NOTE — PROGRESS NOTE ADULT - ASSESSMENT
58F with h/o arthritis, DM, HTN, HLD, hypothyroidism, PVD presents to the ED for chest pain X 2-3 days.  Negative hsTrop x 2  ECG with nonspecific findings    #Atypical chest pain  -- continue asa, atorvastatin  -- s/p LHC with normal cors    #HTN  -- continue atenolol  -- continue lisinopril    #HLD  -- continue atorvastatin    No further cardiac w/u.    Zeke Woodward MD
57 y/o F with h/o arthritis, DM, HTN, HLD, hypothyroidism, PVD presents to the ED for L sided chest pain. Admit to telemetry for cath in AM.

## 2019-05-24 ENCOUNTER — APPOINTMENT (OUTPATIENT)
Dept: DERMATOLOGY | Facility: CLINIC | Age: 59
End: 2019-05-24
Payer: MEDICAID

## 2019-05-24 PROCEDURE — 99203 OFFICE O/P NEW LOW 30 MIN: CPT

## 2019-08-27 ENCOUNTER — APPOINTMENT (OUTPATIENT)
Dept: DERMATOLOGY | Facility: CLINIC | Age: 59
End: 2019-08-27
Payer: MEDICAID

## 2019-08-27 DIAGNOSIS — H01.9 UNSPECIFIED INFLAMMATION OF EYELID: ICD-10-CM

## 2019-08-27 PROCEDURE — 99213 OFFICE O/P EST LOW 20 MIN: CPT

## 2019-10-01 ENCOUNTER — OUTPATIENT (OUTPATIENT)
Dept: OUTPATIENT SERVICES | Facility: HOSPITAL | Age: 59
LOS: 1 days | End: 2019-10-01
Payer: MEDICAID

## 2019-10-01 DIAGNOSIS — Z98.89 OTHER SPECIFIED POSTPROCEDURAL STATES: Chronic | ICD-10-CM

## 2019-10-01 PROCEDURE — G9001: CPT

## 2019-10-14 ENCOUNTER — INPATIENT (INPATIENT)
Facility: HOSPITAL | Age: 59
LOS: 0 days | Discharge: HOME | End: 2019-10-15
Attending: INTERNAL MEDICINE | Admitting: INTERNAL MEDICINE
Payer: MEDICAID

## 2019-10-14 VITALS
DIASTOLIC BLOOD PRESSURE: 68 MMHG | RESPIRATION RATE: 16 BRPM | SYSTOLIC BLOOD PRESSURE: 171 MMHG | HEART RATE: 74 BPM | OXYGEN SATURATION: 100 % | TEMPERATURE: 98 F

## 2019-10-14 DIAGNOSIS — E78.00 PURE HYPERCHOLESTEROLEMIA, UNSPECIFIED: ICD-10-CM

## 2019-10-14 DIAGNOSIS — D72.829 ELEVATED WHITE BLOOD CELL COUNT, UNSPECIFIED: ICD-10-CM

## 2019-10-14 DIAGNOSIS — R07.9 CHEST PAIN, UNSPECIFIED: ICD-10-CM

## 2019-10-14 DIAGNOSIS — E03.9 HYPOTHYROIDISM, UNSPECIFIED: ICD-10-CM

## 2019-10-14 DIAGNOSIS — I10 ESSENTIAL (PRIMARY) HYPERTENSION: ICD-10-CM

## 2019-10-14 DIAGNOSIS — E11.9 TYPE 2 DIABETES MELLITUS WITHOUT COMPLICATIONS: ICD-10-CM

## 2019-10-14 DIAGNOSIS — Z29.9 ENCOUNTER FOR PROPHYLACTIC MEASURES, UNSPECIFIED: ICD-10-CM

## 2019-10-14 DIAGNOSIS — Z98.89 OTHER SPECIFIED POSTPROCEDURAL STATES: Chronic | ICD-10-CM

## 2019-10-14 DIAGNOSIS — D50.9 IRON DEFICIENCY ANEMIA, UNSPECIFIED: ICD-10-CM

## 2019-10-14 LAB
ALBUMIN SERPL ELPH-MCNC: 4.5 G/DL — SIGNIFICANT CHANGE UP (ref 3.3–5)
ALP SERPL-CCNC: 79 U/L — SIGNIFICANT CHANGE UP (ref 40–120)
ALT FLD-CCNC: 17 U/L — SIGNIFICANT CHANGE UP (ref 4–33)
ANION GAP SERPL CALC-SCNC: 11 MMO/L — SIGNIFICANT CHANGE UP (ref 7–14)
ANION GAP SERPL CALC-SCNC: 11 MMO/L — SIGNIFICANT CHANGE UP (ref 7–14)
AST SERPL-CCNC: 17 U/L — SIGNIFICANT CHANGE UP (ref 4–32)
BASOPHILS # BLD AUTO: 0.03 K/UL — SIGNIFICANT CHANGE UP (ref 0–0.2)
BASOPHILS # BLD AUTO: 0.03 K/UL — SIGNIFICANT CHANGE UP (ref 0–0.2)
BASOPHILS NFR BLD AUTO: 0.3 % — SIGNIFICANT CHANGE UP (ref 0–2)
BASOPHILS NFR BLD AUTO: 0.3 % — SIGNIFICANT CHANGE UP (ref 0–2)
BILIRUB SERPL-MCNC: < 0.2 MG/DL — LOW (ref 0.2–1.2)
BUN SERPL-MCNC: 13 MG/DL — SIGNIFICANT CHANGE UP (ref 7–23)
BUN SERPL-MCNC: 15 MG/DL — SIGNIFICANT CHANGE UP (ref 7–23)
CALCIUM SERPL-MCNC: 9.5 MG/DL — SIGNIFICANT CHANGE UP (ref 8.4–10.5)
CALCIUM SERPL-MCNC: 9.6 MG/DL — SIGNIFICANT CHANGE UP (ref 8.4–10.5)
CHLORIDE SERPL-SCNC: 103 MMOL/L — SIGNIFICANT CHANGE UP (ref 98–107)
CHLORIDE SERPL-SCNC: 104 MMOL/L — SIGNIFICANT CHANGE UP (ref 98–107)
CHOLEST SERPL-MCNC: 176 MG/DL — SIGNIFICANT CHANGE UP (ref 120–199)
CO2 SERPL-SCNC: 25 MMOL/L — SIGNIFICANT CHANGE UP (ref 22–31)
CO2 SERPL-SCNC: 25 MMOL/L — SIGNIFICANT CHANGE UP (ref 22–31)
CREAT SERPL-MCNC: 0.56 MG/DL — SIGNIFICANT CHANGE UP (ref 0.5–1.3)
CREAT SERPL-MCNC: 0.64 MG/DL — SIGNIFICANT CHANGE UP (ref 0.5–1.3)
D DIMER BLD IA.RAPID-MCNC: < 150 NG/ML — SIGNIFICANT CHANGE UP
EOSINOPHIL # BLD AUTO: 0.11 K/UL — SIGNIFICANT CHANGE UP (ref 0–0.5)
EOSINOPHIL # BLD AUTO: 0.16 K/UL — SIGNIFICANT CHANGE UP (ref 0–0.5)
EOSINOPHIL NFR BLD AUTO: 1.2 % — SIGNIFICANT CHANGE UP (ref 0–6)
EOSINOPHIL NFR BLD AUTO: 1.4 % — SIGNIFICANT CHANGE UP (ref 0–6)
FERRITIN SERPL-MCNC: 10.16 NG/ML — LOW (ref 15–150)
GLUCOSE SERPL-MCNC: 84 MG/DL — SIGNIFICANT CHANGE UP (ref 70–99)
GLUCOSE SERPL-MCNC: 92 MG/DL — SIGNIFICANT CHANGE UP (ref 70–99)
HBA1C BLD-MCNC: 7.4 % — HIGH (ref 4–5.6)
HCT VFR BLD CALC: 31.7 % — LOW (ref 34.5–45)
HCT VFR BLD CALC: 33.9 % — LOW (ref 34.5–45)
HDLC SERPL-MCNC: 55 MG/DL — SIGNIFICANT CHANGE UP (ref 45–65)
HGB BLD-MCNC: 9.4 G/DL — LOW (ref 11.5–15.5)
HGB BLD-MCNC: 9.7 G/DL — LOW (ref 11.5–15.5)
IMM GRANULOCYTES NFR BLD AUTO: 0.3 % — SIGNIFICANT CHANGE UP (ref 0–1.5)
IMM GRANULOCYTES NFR BLD AUTO: 0.3 % — SIGNIFICANT CHANGE UP (ref 0–1.5)
IRON SATN MFR SERPL: 23 UG/DL — LOW (ref 30–160)
IRON SATN MFR SERPL: 450 UG/DL — SIGNIFICANT CHANGE UP (ref 140–530)
LIPID PNL WITH DIRECT LDL SERPL: 117 MG/DL — SIGNIFICANT CHANGE UP
LYMPHOCYTES # BLD AUTO: 3.81 K/UL — HIGH (ref 1–3.3)
LYMPHOCYTES # BLD AUTO: 4.7 K/UL — HIGH (ref 1–3.3)
LYMPHOCYTES # BLD AUTO: 40.7 % — SIGNIFICANT CHANGE UP (ref 13–44)
LYMPHOCYTES # BLD AUTO: 41.2 % — SIGNIFICANT CHANGE UP (ref 13–44)
MAGNESIUM SERPL-MCNC: 1.9 MG/DL — SIGNIFICANT CHANGE UP (ref 1.6–2.6)
MCHC RBC-ENTMCNC: 22.5 PG — LOW (ref 27–34)
MCHC RBC-ENTMCNC: 22.7 PG — LOW (ref 27–34)
MCHC RBC-ENTMCNC: 28.6 % — LOW (ref 32–36)
MCHC RBC-ENTMCNC: 29.7 % — LOW (ref 32–36)
MCV RBC AUTO: 76.4 FL — LOW (ref 80–100)
MCV RBC AUTO: 78.7 FL — LOW (ref 80–100)
MONOCYTES # BLD AUTO: 0.46 K/UL — SIGNIFICANT CHANGE UP (ref 0–0.9)
MONOCYTES # BLD AUTO: 0.71 K/UL — SIGNIFICANT CHANGE UP (ref 0–0.9)
MONOCYTES NFR BLD AUTO: 4.9 % — SIGNIFICANT CHANGE UP (ref 2–14)
MONOCYTES NFR BLD AUTO: 6.2 % — SIGNIFICANT CHANGE UP (ref 2–14)
NEUTROPHILS # BLD AUTO: 4.93 K/UL — SIGNIFICANT CHANGE UP (ref 1.8–7.4)
NEUTROPHILS # BLD AUTO: 5.78 K/UL — SIGNIFICANT CHANGE UP (ref 1.8–7.4)
NEUTROPHILS NFR BLD AUTO: 50.6 % — SIGNIFICANT CHANGE UP (ref 43–77)
NEUTROPHILS NFR BLD AUTO: 52.6 % — SIGNIFICANT CHANGE UP (ref 43–77)
NRBC # FLD: 0 K/UL — SIGNIFICANT CHANGE UP (ref 0–0)
NRBC # FLD: 0 K/UL — SIGNIFICANT CHANGE UP (ref 0–0)
PHOSPHATE SERPL-MCNC: 3.3 MG/DL — SIGNIFICANT CHANGE UP (ref 2.5–4.5)
PLATELET # BLD AUTO: 333 K/UL — SIGNIFICANT CHANGE UP (ref 150–400)
PLATELET # BLD AUTO: 357 K/UL — SIGNIFICANT CHANGE UP (ref 150–400)
PMV BLD: 11.6 FL — SIGNIFICANT CHANGE UP (ref 7–13)
PMV BLD: 11.8 FL — SIGNIFICANT CHANGE UP (ref 7–13)
POTASSIUM SERPL-MCNC: 4.1 MMOL/L — SIGNIFICANT CHANGE UP (ref 3.5–5.3)
POTASSIUM SERPL-MCNC: 4.2 MMOL/L — SIGNIFICANT CHANGE UP (ref 3.5–5.3)
POTASSIUM SERPL-SCNC: 4.1 MMOL/L — SIGNIFICANT CHANGE UP (ref 3.5–5.3)
POTASSIUM SERPL-SCNC: 4.2 MMOL/L — SIGNIFICANT CHANGE UP (ref 3.5–5.3)
PROT SERPL-MCNC: 7.6 G/DL — SIGNIFICANT CHANGE UP (ref 6–8.3)
RBC # BLD: 4.15 M/UL — SIGNIFICANT CHANGE UP (ref 3.8–5.2)
RBC # BLD: 4.31 M/UL — SIGNIFICANT CHANGE UP (ref 3.8–5.2)
RBC # FLD: 15.3 % — HIGH (ref 10.3–14.5)
RBC # FLD: 15.6 % — HIGH (ref 10.3–14.5)
SODIUM SERPL-SCNC: 139 MMOL/L — SIGNIFICANT CHANGE UP (ref 135–145)
SODIUM SERPL-SCNC: 140 MMOL/L — SIGNIFICANT CHANGE UP (ref 135–145)
TRIGL SERPL-MCNC: 92 MG/DL — SIGNIFICANT CHANGE UP (ref 10–149)
TROPONIN T, HIGH SENSITIVITY: < 6 NG/L — SIGNIFICANT CHANGE UP (ref ?–14)
TROPONIN T, HIGH SENSITIVITY: < 6 NG/L — SIGNIFICANT CHANGE UP (ref ?–14)
TSH SERPL-MCNC: 2.79 UIU/ML — SIGNIFICANT CHANGE UP (ref 0.27–4.2)
UIBC SERPL-MCNC: 427 UG/DL — HIGH (ref 110–370)
WBC # BLD: 11.41 K/UL — HIGH (ref 3.8–10.5)
WBC # BLD: 9.37 K/UL — SIGNIFICANT CHANGE UP (ref 3.8–10.5)
WBC # FLD AUTO: 11.41 K/UL — HIGH (ref 3.8–10.5)
WBC # FLD AUTO: 9.37 K/UL — SIGNIFICANT CHANGE UP (ref 3.8–10.5)

## 2019-10-14 PROCEDURE — 71046 X-RAY EXAM CHEST 2 VIEWS: CPT | Mod: 26

## 2019-10-14 PROCEDURE — 99222 1ST HOSP IP/OBS MODERATE 55: CPT

## 2019-10-14 PROCEDURE — 99223 1ST HOSP IP/OBS HIGH 75: CPT

## 2019-10-14 RX ORDER — ATORVASTATIN CALCIUM 80 MG/1
1 TABLET, FILM COATED ORAL
Qty: 0 | Refills: 0 | DISCHARGE

## 2019-10-14 RX ORDER — ASPIRIN/CALCIUM CARB/MAGNESIUM 324 MG
162 TABLET ORAL ONCE
Refills: 0 | Status: COMPLETED | OUTPATIENT
Start: 2019-10-14 | End: 2019-10-14

## 2019-10-14 RX ORDER — FAMOTIDINE 10 MG/ML
20 INJECTION INTRAVENOUS ONCE
Refills: 0 | Status: COMPLETED | OUTPATIENT
Start: 2019-10-14 | End: 2019-10-14

## 2019-10-14 RX ORDER — INSULIN LISPRO 100/ML
VIAL (ML) SUBCUTANEOUS AT BEDTIME
Refills: 0 | Status: DISCONTINUED | OUTPATIENT
Start: 2019-10-14 | End: 2019-10-15

## 2019-10-14 RX ORDER — DEXTROSE 50 % IN WATER 50 %
25 SYRINGE (ML) INTRAVENOUS ONCE
Refills: 0 | Status: DISCONTINUED | OUTPATIENT
Start: 2019-10-14 | End: 2019-10-15

## 2019-10-14 RX ORDER — FAMOTIDINE 10 MG/ML
1 INJECTION INTRAVENOUS
Qty: 0 | Refills: 0 | DISCHARGE

## 2019-10-14 RX ORDER — ATORVASTATIN CALCIUM 80 MG/1
20 TABLET, FILM COATED ORAL AT BEDTIME
Refills: 0 | Status: DISCONTINUED | OUTPATIENT
Start: 2019-10-14 | End: 2019-10-15

## 2019-10-14 RX ORDER — LOSARTAN POTASSIUM 100 MG/1
50 TABLET, FILM COATED ORAL DAILY
Refills: 0 | Status: DISCONTINUED | OUTPATIENT
Start: 2019-10-14 | End: 2019-10-15

## 2019-10-14 RX ORDER — GLUCAGON INJECTION, SOLUTION 0.5 MG/.1ML
1 INJECTION, SOLUTION SUBCUTANEOUS ONCE
Refills: 0 | Status: DISCONTINUED | OUTPATIENT
Start: 2019-10-14 | End: 2019-10-15

## 2019-10-14 RX ORDER — NITROGLYCERIN 6.5 MG
0.4 CAPSULE, EXTENDED RELEASE ORAL
Refills: 0 | Status: DISCONTINUED | OUTPATIENT
Start: 2019-10-14 | End: 2019-10-15

## 2019-10-14 RX ORDER — SODIUM CHLORIDE 9 MG/ML
1000 INJECTION, SOLUTION INTRAVENOUS
Refills: 0 | Status: DISCONTINUED | OUTPATIENT
Start: 2019-10-14 | End: 2019-10-15

## 2019-10-14 RX ORDER — LOSARTAN POTASSIUM 100 MG/1
1 TABLET, FILM COATED ORAL
Qty: 0 | Refills: 0 | DISCHARGE

## 2019-10-14 RX ORDER — LEVOTHYROXINE SODIUM 125 MCG
75 TABLET ORAL DAILY
Refills: 0 | Status: DISCONTINUED | OUTPATIENT
Start: 2019-10-14 | End: 2019-10-15

## 2019-10-14 RX ORDER — INSULIN GLARGINE 100 [IU]/ML
15 INJECTION, SOLUTION SUBCUTANEOUS AT BEDTIME
Refills: 0 | Status: DISCONTINUED | OUTPATIENT
Start: 2019-10-14 | End: 2019-10-15

## 2019-10-14 RX ORDER — ATENOLOL 25 MG/1
50 TABLET ORAL DAILY
Refills: 0 | Status: DISCONTINUED | OUTPATIENT
Start: 2019-10-14 | End: 2019-10-15

## 2019-10-14 RX ORDER — ASPIRIN/CALCIUM CARB/MAGNESIUM 324 MG
81 TABLET ORAL DAILY
Refills: 0 | Status: DISCONTINUED | OUTPATIENT
Start: 2019-10-14 | End: 2019-10-15

## 2019-10-14 RX ORDER — INSULIN LISPRO 100/ML
VIAL (ML) SUBCUTANEOUS
Refills: 0 | Status: DISCONTINUED | OUTPATIENT
Start: 2019-10-14 | End: 2019-10-15

## 2019-10-14 RX ORDER — DEXTROSE 50 % IN WATER 50 %
15 SYRINGE (ML) INTRAVENOUS ONCE
Refills: 0 | Status: DISCONTINUED | OUTPATIENT
Start: 2019-10-14 | End: 2019-10-15

## 2019-10-14 RX ORDER — DEXTROSE 50 % IN WATER 50 %
12.5 SYRINGE (ML) INTRAVENOUS ONCE
Refills: 0 | Status: DISCONTINUED | OUTPATIENT
Start: 2019-10-14 | End: 2019-10-15

## 2019-10-14 RX ORDER — PANTOPRAZOLE SODIUM 20 MG/1
40 TABLET, DELAYED RELEASE ORAL
Refills: 0 | Status: DISCONTINUED | OUTPATIENT
Start: 2019-10-14 | End: 2019-10-15

## 2019-10-14 RX ADMIN — Medication 1: at 11:57

## 2019-10-14 RX ADMIN — ATORVASTATIN CALCIUM 20 MILLIGRAM(S): 80 TABLET, FILM COATED ORAL at 22:12

## 2019-10-14 RX ADMIN — ATENOLOL 50 MILLIGRAM(S): 25 TABLET ORAL at 05:56

## 2019-10-14 RX ADMIN — INSULIN GLARGINE 15 UNIT(S): 100 INJECTION, SOLUTION SUBCUTANEOUS at 22:12

## 2019-10-14 RX ADMIN — PANTOPRAZOLE SODIUM 40 MILLIGRAM(S): 20 TABLET, DELAYED RELEASE ORAL at 09:31

## 2019-10-14 RX ADMIN — FAMOTIDINE 20 MILLIGRAM(S): 10 INJECTION INTRAVENOUS at 04:08

## 2019-10-14 RX ADMIN — Medication 30 MILLILITER(S): at 04:08

## 2019-10-14 RX ADMIN — LOSARTAN POTASSIUM 50 MILLIGRAM(S): 100 TABLET, FILM COATED ORAL at 05:56

## 2019-10-14 RX ADMIN — Medication 75 MICROGRAM(S): at 05:56

## 2019-10-14 RX ADMIN — Medication 162 MILLIGRAM(S): at 04:08

## 2019-10-14 RX ADMIN — Medication 81 MILLIGRAM(S): at 11:07

## 2019-10-14 NOTE — ED ADULT TRIAGE NOTE - CHIEF COMPLAINT QUOTE
chest pain    left sided chest pain rad to arm for 1 day with sob, dizziness.  diabetes, htn, hi chol hx. states had angio 1 yr ago- was negative

## 2019-10-14 NOTE — CONSULT NOTE ADULT - ASSESSMENT
59F with PMHx DM2, HTN, HLD, Hypothyroidism, and OA who presents to the hospital who presents for acute left-sided chest pain. Cardiology consulted for chest pain, r/o ACS.    Recommendations:  #Atypical chest pain  Patient with acute left-sided chest pain, non-exertional, non-radiating, reproducible, improved with Maalox. Troponins negative x 2, EKG with NSR and no STT changes. Recent cardiac cath with normal results. However, patient does have significant family Hx of CAD and mortality. Currently on ASA 81 and Atorvastatin 20mg qd at home.  -Unlikely ACS  -Given reproducibility and improvement with antacid, possibly 2/2 musculoskeletal etiology vs GERD  -ASCVD risk score 8.2%, consider increasing to high intensity statin dose    ***This is a preliminary note. To be discussed with attending. 59F with PMHx DM2, HTN, HLD, Hypothyroidism, and OA who presents to the hospital who presents for acute left-sided chest pain. Cardiology consulted for chest pain, r/o ACS.    Recommendations:  #Atypical chest pain  Patient with acute left-sided chest pain, non-exertional, non-radiating, reproducible, improved with Maalox. Troponins negative x 2, EKG with NSR and no STT changes. Recent cardiac cath with normal results. However, patient does have RFs for ischemic heart disease, including significant family Hx of CAD and mortality, which patient is very concerned about. Currently on ASA 81 and Atorvastatin 20mg qd at home.  -Less likely ACS given presentation however patient with significant RFs so can pursue stress testing  -ASCVD risk score 8.2%, consider increasing to high intensity statin dose

## 2019-10-14 NOTE — ED PROVIDER NOTE - CLINICAL SUMMARY MEDICAL DECISION MAKING FREE TEXT BOX
58 yo F presenting with SoB and chest pain may be due to ACS vs PE vs less likely infectious. Appears in NAD on exam hwoever given risk factors will likely admit for stress test/ARMIDA as pt still having sx, cardiac w/u

## 2019-10-14 NOTE — CONSULT NOTE ADULT - ATTENDING COMMENTS
D/w Dr. Chatterjee, pt will undergo stress testing. We will evaluate after the stress test is complete.

## 2019-10-14 NOTE — H&P ADULT - NSICDXFAMILYHX_GEN_ALL_CORE_FT
FAMILY HISTORY:  Family history of CVA, father  at 59 y/o  Family history of myocardial infarction at age less than 60, mother  at 45 from MI    Sibling  Still living? No  Family history of myocardial infarction at age less than 60, Age at diagnosis: Age Unknown

## 2019-10-14 NOTE — ED PROVIDER NOTE - ATTENDING CONTRIBUTION TO CARE
Lainez: 59 yof with HTN, DM, hypothyroid, HLD, OA complaining of chest dull achy pain intermittent since afternoon associated with pain in left arm (around AC area) and left shoulder, also with SOB, nausea and diaphoresis. No neck pain, no vomiting. Pt also had brief dizziness one day prior (felt like furniture moving). Left calf achy pain few days as well. On exam, pt is very well appearing, no reproducible tn, clear lungs, rrr, abd soft, no edema, no calf tn. EKG NSR. last cath 1 year ago. Pt has concerning story for ACS but will check CXR and labs. d-dimer low risk for PE. anemia noted (no history as per pt) Discussed with Dr. Chatterjee and will send iron studies and admit for further workup.

## 2019-10-14 NOTE — H&P ADULT - NSICDXPASTSURGICALHX_GEN_ALL_CORE_FT
PAST SURGICAL HISTORY:  H/O  section     Knee Replacement partial knee replacement  on R    Tubal Ligation Status 1989

## 2019-10-14 NOTE — H&P ADULT - PROBLEM SELECTOR PLAN 3
- Mild leukocytosis without any infectious complaints, suspect this is reactionary 2/2 acute stress, will monitor off abx for now

## 2019-10-14 NOTE — H&P ADULT - NSHPLABSRESULTS_GEN_ALL_CORE
LABS and ADDITIONAL STUDIES:                        9.7    11.41 )-----------( 357      ( 14 Oct 2019 01:00 )             33.9     Complete Blood Count + Automated Diff (10.14.19 @ 01:00)    Mean Cell Volume: 78.7 fL    Mean Cell Hemoglobin: 22.5 pg    Mean Cell Hemoglobin Conc: 28.6 %  Iron with Total Binding Capacity (10.14.19 @ 01:00)    Iron Total, Serum: 23 ug/dL    Unsaturated Iron Binding Capacity: 427.0 ug/dL    Total Iron Binding Capacity.: 450 ug/dL  Ferritin, Serum (10.14.19 @ 01:00)    Ferritin, Serum: 10.16 ng/mL    10-14    139  |  103  |  15  ----------------------------<  92  4.2   |  25  |  0.64    Ca    9.6      14 Oct 2019 01:00    TPro  7.6  /  Alb  4.5  /  TBili  < 0.2<L>  /  DBili  x   /  AST  17  /  ALT  17  /  AlkPhos  79  10-14    LIVER FUNCTIONS - ( 14 Oct 2019 01:00 )  Alb: 4.5 g/dL / Pro: 7.6 g/dL / ALK PHOS: 79 u/L / ALT: 17 u/L / AST: 17 u/L / GGT: x           Troponin T, High Sensitivity (10.14.19 @ 01:00)    Troponin T, High Sensitivity: < 6    < from: Xray Chest 2 Views PA/Lat (10.14.19 @ 01:42) >  ******PRELIMINARY REPORT******        INTERPRETATION:  No emergent findings.  < end of copied text >    EKG - NSR at 79, QRS 72, QTc 444, nl axis, no significant ST-T wave changes

## 2019-10-14 NOTE — ED PROVIDER NOTE - OBJECTIVE STATEMENT
58 yo F hx HTN DM hypothyroidism p/w 1 day of chest pain midsternal "pressure" occasionally radiating down the left arm. She has also been short of breath durign this time, worse with walking/exertion, and also at rest. Notes she has been belching during this last day as well. She has been having left calf pain for the last few days and recently drove to [place]. Had a recent clean cath 1 year ago per EMR. Denies recent n/v/d dysuria vision changes ALLEN.    PMD DR Chatterjee 58 yo F hx HTN DM hypothyroidism p/w 1 day of chest pain midsternal "pressure" occasionally radiating down the left arm. She has also been short of breath durign this time, worse with walking/exertion, and also at rest. Notes she has been belching during this last day as well. She has been having  left calf pain for the last few days and recently drove to [place]. Had a recent clean cath 1 year ago per EMR. Denies recent n/v/d dysuria vision changes ALLEN.    PMD DR Chatterjee

## 2019-10-14 NOTE — H&P ADULT - NSICDXPASTMEDICALHX_GEN_ALL_CORE_FT
PAST MEDICAL HISTORY:  Chronic Osteoarthritis     Diabetes Mellitus x 1 year with neuropathy    HTN (Hypertension)     Hypercholesterolemia     Hypothyroidism     PVD (Peripheral Vascular Disease)

## 2019-10-14 NOTE — H&P ADULT - HISTORY OF PRESENT ILLNESS
This is a 59F with history of DM2, HTN, HLD, Hypothyroidism, and OA who presents to the hospital with complaints of L sided chest pain that is pressure like in quality. Said that the pain started yesterday around 4 PM while she was out with her family eating some food. Said that the pain is a dul pain, feels like a "burden" on her chest, and radiates to her arm. Denies the pain worsening with exertion but said that she was having worsening shortness of breath with the pain. This is a 59F with history of DM2, HTN, HLD, Hypothyroidism, and OA who presents to the hospital with complaints of L sided chest pain that is pressure like in quality. Said that the pain started yesterday around 4 PM while she was out with her family eating some food. Said that the pain is a dul pain, feels like a "burden" on her chest, and radiates to her arm. Denies the pain worsening with exertion but said that she was having worsening shortness of breath with the pain. Denies any LE swelling or palpitations. Was having some dizziness on Saturday but did not have any chest pain at that time. Patient had a similar hospitalization last year and had a cardiac catheterization at that time which was negative. Said that she has an extensive history of CAD in her family with her father, mother, and brother all having MIs (at ages 68, 45, and 58 respectively).     Of note, patient is also c/o LE calf muscle ache, said that this started a few months ago but has not improved at all, denies that pain worsening over time either.     On arrival to the ED, her vitals were T 98.2, P 74, SALAS 171/68, RR 16, O2 sat 100% RA. This is a 59F with history of DM2, HTN, HLD, Hypothyroidism, and OA who presents to the hospital with complaints of L sided chest pain that is pressure like in quality. Said that the pain started yesterday around 4 PM while she was out with her family eating some food. Said that the pain is a dul pain, feels like a "burden" on her chest, and radiates to her arm. Denies the pain worsening with exertion but said that she was having worsening shortness of breath with the pain. Denies any LE swelling or palpitations. Was having some dizziness on Saturday but did not have any chest pain at that time. Patient had a similar hospitalization last year and had a cardiac catheterization at that time which was negative. Said that she has an extensive history of CAD in her family with her father, mother, and brother all having MIs (at ages 68, 45, and 58 respectively).     Of note, patient is also c/o LE calf muscle ache, said that this started a few months ago but has not improved at all, denies that pain worsening over time either.     On arrival to the ED, her vitals were T 98.2, P 74, SALAS 171/68, RR 16, O2 sat 100% RA. EKG showed worsening microcytic anemia and mild leukocytosis. Her hsTrop was negative x1. Her chest xray did not show any acute abnormalities and her EKG was without any ischemic changes.D-Dimer was done on the patient due to a concern for possible PE as a cause of her symptoms but the D-Dimer was negative. She was then admitted to telemetry.

## 2019-10-14 NOTE — H&P ADULT - ASSESSMENT
This is a 59F with history as above who presents to the hospital with complaints of L sided chest pain of unclear etiology.

## 2019-10-14 NOTE — H&P ADULT - NSHPPHYSICALEXAM_GEN_ALL_CORE
Vital Signs Last 24 Hrs  T(C): 36.7 (14 Oct 2019 01:29), Max: 36.8 (14 Oct 2019 00:35)  T(F): 98 (14 Oct 2019 01:29), Max: 98.2 (14 Oct 2019 00:35)  HR: 71 (14 Oct 2019 01:29) (71 - 74)  BP: 177/67 (14 Oct 2019 01:29) (171/68 - 177/67)  BP(mean): --  RR: 16 (14 Oct 2019 01:29) (16 - 16)  SpO2: 100% (14 Oct 2019 01:29) (100% - 100%)    GENERAL: No acute distress, well-developed  ENT: EOMI, PERRL, conjunctiva and sclera clear, Neck supple, No JVD, moist mucosa  CHEST/LUNG: Clear to auscultation bilaterally; No wheeze, equal breath sounds bilaterally   BACK: No spinal tenderness, no CVA tenderness  HEART: Regular rate and rhythm; No murmurs, rubs, or gallops  ABDOMEN: Soft, Nontender, Nondistended; Bowel sounds present  EXTREMITIES:  No clubbing, cyanosis, or edema  PSYCH: Nl behavior, nl affect  NEUROLOGY: AAOx3, non-focal  SKIN: Normal color, No rashes or lesions

## 2019-10-14 NOTE — PROGRESS NOTE ADULT - SUBJECTIVE AND OBJECTIVE BOX
Patient is a 58y old  Female who presents with a chief complaint of chest pain (17 Oct 2018 14:36)      SUBJECTIVE / OVERNIGHT EVENTS:  No CP overnight. Trop negative  Review of Systems:   CONSTITUTIONAL: No fever, weight loss, or fatigue  EYES: No eye pain, visual disturbances, or discharge  ENMT:  No difficulty hearing, tinnitus, vertigo; No sinus or throat pain  NECK: No pain or stiffness  BREASTS: No pain, masses, or nipple discharge  RESPIRATORY: No cough, wheezing, chills or hemoptysis; No shortness of breath  CARDIOVASCULAR: No chest pain, palpitations, dizziness, or leg swelling  GASTROINTESTINAL: No abdominal or epigastric pain. No nausea, vomiting, or hematemesis; No diarrhea or constipation. No melena or hematochezia.  GENITOURINARY: No dysuria, frequency, hematuria, or incontinence  NEUROLOGICAL: No headaches, memory loss, loss of strength, numbness, or tremors  SKIN: No itching, burning, rashes, or lesions   LYMPH NODES: No enlarged glands  ENDOCRINE: No heat or cold intolerance; No hair loss  MUSCULOSKELETAL: No joint pain or swelling; No muscle, back, or extremity pain  PSYCHIATRIC: No depression, anxiety, mood swings, or difficulty sleeping  HEME/LYMPH: No easy bruising, or bleeding gums  ALLERY AND IMMUNOLOGIC: No hives or eczema    MEDICATIONS  (STANDING):  aspirin  chewable 81 milliGRAM(s) Oral daily  ATENolol  Tablet 25 milliGRAM(s) Oral daily  atorvastatin 10 milliGRAM(s) Oral at bedtime  dextrose 5%. 1000 milliLiter(s) (50 mL/Hr) IV Continuous <Continuous>  dextrose 50% Injectable 12.5 Gram(s) IV Push once  dextrose 50% Injectable 25 Gram(s) IV Push once  dextrose 50% Injectable 25 Gram(s) IV Push once  famotidine    Tablet 40 milliGRAM(s) Oral daily  heparin  Injectable 5000 Unit(s) SubCutaneous every 12 hours  insulin glargine Injectable (LANTUS) 20 Unit(s) SubCutaneous at bedtime  insulin lispro (HumaLOG) corrective regimen sliding scale   SubCutaneous three times a day before meals  insulin lispro (HumaLOG) corrective regimen sliding scale   SubCutaneous at bedtime  levothyroxine 75 MICROGram(s) Oral daily  losartan 25 milliGRAM(s) Oral daily    MEDICATIONS  (PRN):  dextrose 40% Gel 15 Gram(s) Oral once PRN Blood Glucose LESS THAN 70 milliGRAM(s)/deciliter  glucagon  Injectable 1 milliGRAM(s) IntraMuscular once PRN Glucose LESS THAN 70 milligrams/deciliter      PHYSICAL EXAM:  Vital Signs Last 24 Hrs  T(C): 36.8 (17 Oct 2018 15:07), Max: 36.8 (17 Oct 2018 15:07)  T(F): 98.2 (17 Oct 2018 15:07), Max: 98.2 (17 Oct 2018 15:07)  HR: 72 (17 Oct 2018 15:07) (64 - 73)  BP: 139/81 (17 Oct 2018 15:07) (114/61 - 158/81)  BP(mean): --  RR: 16 (17 Oct 2018 15:07) (16 - 18)  SpO2: 98% (17 Oct 2018 15:07) (98% - 100%)  I&O's Summary    GENERAL: NAD, well-developed  HEAD:  Atraumatic, Normocephalic  EYES: EOMI, PERRLA, conjunctiva and sclera clear  NECK: Supple, No JVD  CHEST/LUNG: Clear to auscultation bilaterally; No wheeze  HEART: Regular rate and rhythm; No murmurs, rubs, or gallops  ABDOMEN: Soft, Nontender, Nondistended; Bowel sounds present  EXTREMITIES:  2+ Peripheral Pulses, No clubbing, cyanosis, or edema  PSYCH: AAOx3  NEUROLOGY: non-focal  SKIN: No rashes or lesions    LABS:  CAPILLARY BLOOD GLUCOSE      POCT Blood Glucose.: 107 mg/dL (17 Oct 2018 19:27)  POCT Blood Glucose.: 79 mg/dL (17 Oct 2018 17:13)  POCT Blood Glucose.: 88 mg/dL (17 Oct 2018 13:10)  POCT Blood Glucose.: 100 mg/dL (17 Oct 2018 08:51)  POCT Blood Glucose.: 81 mg/dL (16 Oct 2018 22:13)                          11.1   7.97  )-----------( 261      ( 17 Oct 2018 07:20 )             35.8     10-17    141  |  106  |  15  ----------------------------<  110<H>  4.2   |  24  |  0.62    Ca    8.8      17 Oct 2018 04:59  Phos  3.2     10-17  Mg     2.0     10-17    TPro  8.2  /  Alb  4.6  /  TBili  < 0.2<L>  /  DBili  x   /  AST  20  /  ALT  11  /  AlkPhos  87  10-16    PT/INR - ( 16 Oct 2018 18:40 )   PT: 13.9 SEC;   INR: 1.20          PTT - ( 16 Oct 2018 18:40 )  PTT:25.5 SEC  CARDIAC MARKERS ( 17 Oct 2018 04:59 )  x     / x     / 86 u/L / 1.74 ng/mL / x      CARDIAC MARKERS ( 16 Oct 2018 18:40 )  x     / x     / 132 u/L / 2.32 ng/mL / x              RADIOLOGY & ADDITIONAL TESTS:    Imaging Personally Reviewed:    Consultant(s) Notes Reviewed:      Care Discussed with Consultants/Other Providers:

## 2019-10-14 NOTE — H&P ADULT - PROBLEM SELECTOR PLAN 4
- on lantus/humalog at home, states she only takes humalog before dinner though  - For now will place on lantus 15U qHS and HISS  - FS qAC, diabetic diet

## 2019-10-14 NOTE — H&P ADULT - PROBLEM SELECTOR PLAN 1
- Unclear cause of patient's chest pain, given her personal and family history she is at risk of CAD but had a cardiac angiogram last year that did not show any flow limiting lesions, current EKG without any ischemic changes, hsTrop neg x1 -> for now will monitor patient on telemetry, will obtain TTE, patient will likely benefit from a stress test but would defer decision to cardiology in AM  - Cards eval in AM (primary team to determine)  - There was a concern for possible PE during her initial w/u (low Wells Score) and a D-Dimer was sent which was negative, lower suspicion that PE is a cause of patient's complaints but if suspicion remains then consider w/u with a CTA (primary team to determine)  - Will c/w home ASA, statin therapy

## 2019-10-14 NOTE — PROGRESS NOTE ADULT - ASSESSMENT
57 y/o F with h/o arthritis, DM, HTN, HLD, hypothyroidism, PVD presents to the ED for L sided chest pain. Admit to telemetry for cath in AM.

## 2019-10-14 NOTE — CONSULT NOTE ADULT - SUBJECTIVE AND OBJECTIVE BOX
Nichole Chen, PGY-1  Internal Medicine  Pager #: [phone] / [facility] [phone]    Patient seen and evaluated at bedside    Chief Complaint: chest pain    HPI:  This is a 59F with history of DM2, HTN, HLD, Hypothyroidism, and OA who presents to the hospital with complaints of L sided chest pain that is pressure like in quality. Said that the pain started yesterday around 4 PM while she was out with her family eating some food. Said that the pain is a dul pain, feels like a "burden" on her chest, and radiates to her arm. Denies the pain worsening with exertion but said that she was having worsening shortness of breath with the pain. Denies any LE swelling or palpitations. Was having some dizziness on Saturday but did not have any chest pain at that time. Patient had a similar hospitalization last year and had a cardiac catheterization at that time which was negative. Said that she has an extensive history of CAD in her family with her father, mother, and brother all having MIs (at ages 68, 45, and 58 respectively).     Of note, patient is also c/o LE calf muscle ache, said that this started a few months ago but has not improved at all, denies that pain worsening over time either.     On arrival to the ED, her vitals were T 98.2, P 74, SALAS 171/68, RR 16, O2 sat 100% RA. EKG showed worsening microcytic anemia and mild leukocytosis. Her hsTrop was negative x1. Her chest xray did not show any acute abnormalities and her EKG was without any ischemic changes.D-Dimer was done on the patient due to a concern for possible PE as a cause of her symptoms but the D-Dimer was negative. She was then admitted to telemetry. (14 Oct 2019 05:00)      Patient reports dull, left-sided chest pain that began yesterday afternoon while eating, associated with shortness of breath. Pain initially with mild temporary relief after taking antacid, however returned last night while patient lying in bed and getting ready to sleep, which is why she decided to come to hospital. Also reports left-sided shoulder pain, however it feels like her chronic muscle aches related to her work, patient does not think it is related to chest pain. Since admission, chest pain has improved since getting Maalox.    PMHx:   Arthritis  Chronic Osteoarthritis  Hypothyroidism  Diabetes Mellitus  Hypercholesterolemia  HTN (Hypertension)  PVD (Peripheral Vascular Disease)      PSHx:   H/O  section  Tubal Ligation Status  Knee Replacement      Allergies:  adhesives (Rash)  aloe vera (Swelling)  baking soda (Hives)  Celebrex (Swelling)  sulfa drugs (Unknown)      Home Meds:    Current Medications:   aspirin enteric coated 81 milliGRAM(s) Oral daily  ATENolol  Tablet 50 milliGRAM(s) Oral daily  atorvastatin 20 milliGRAM(s) Oral at bedtime  dextrose 40% Gel 15 Gram(s) Oral once PRN  dextrose 5%. 1000 milliLiter(s) IV Continuous <Continuous>  dextrose 50% Injectable 12.5 Gram(s) IV Push once  dextrose 50% Injectable 25 Gram(s) IV Push once  dextrose 50% Injectable 25 Gram(s) IV Push once  glucagon  Injectable 1 milliGRAM(s) IntraMuscular once PRN  insulin glargine Injectable (LANTUS) 15 Unit(s) SubCutaneous at bedtime  insulin lispro (HumaLOG) corrective regimen sliding scale   SubCutaneous three times a day before meals  insulin lispro (HumaLOG) corrective regimen sliding scale   SubCutaneous at bedtime  levothyroxine 75 MICROGram(s) Oral daily  losartan 50 milliGRAM(s) Oral daily  nitroglycerin     SubLingual 0.4 milliGRAM(s) SubLingual every 5 minutes PRN  pantoprazole    Tablet 40 milliGRAM(s) Oral before breakfast      FAMILY HISTORY:  Family history of myocardial infarction at age less than 60 (Sibling): brother  at 56 y/o from MI  Family history of CVA: father  at 59 y/o  Family history of myocardial infarction at age less than 60: mother  at 45 from MI      Social History:  Smoking History: denies  Alcohol Use: denies  Drug Use: denies    REVIEW OF SYSTEMS:  CONSTITUTIONAL: No weakness, fevers or chills  EYES/ENT: No visual changes;  No dysphagia  NECK: No pain or stiffness  RESPIRATORY: No cough, wheezing, hemoptysis; No shortness of breath  CARDIOVASCULAR: +chest pain, no palpitations; No lower extremity edema  GASTROINTESTINAL: No abdominal or epigastric pain. No nausea, vomiting, or hematemesis; No diarrhea or constipation. No melena or hematochezia.  BACK: No back pain  GENITOURINARY: No dysuria, frequency or hematuria  NEUROLOGICAL: No numbness or weakness  SKIN: No itching, burning, rashes, or lesions   All other review of systems is negative unless indicated above.    Physical Exam:  T(F): 97.7 (10-14), Max: 98.2 (10-14)  HR: 65 (10-14) (62 - 74)  BP: 146/63 (10-14) (139/69 - 177/67)  RR: 18 (10-14)  SpO2: 100% (10-14)  Gen: NAD.  HEENT: NCAT. PERRLA b/l.  Neck: No JVP elev.  CV: Normal S1, S2. RRR. No MRG.  Chest: CTAB. No WRR. TTP across central/left chest.  Abd: +BSx4. Soft. NTND.  Ext: No LE edema.  Skin: No cyanosis.    Cardiovascular Diagnostic Testing:    ECG: Personally reviewed: normal sinus rhythm, ventricular rate ~75,     Echo: Personally reviewed: no recent. last TTE from 2010 with normal LV & RV size and systolic function.    Stress Testing:    Cath: prior LHC/RHC from 10/17/18 as below    < from: Cardiac Cath Lab - Adult (10.17.18 @ 17:46) >  INDICATIONS: Precordial pain.  HISTORY: No history of previous myocardial infarction. The patient has  hypertension and medication-treated dyslipidemia.  PROCEDURE:  --  Left coronary angiography.  --  Right coronary angiography.  TECHNIQUE: The risks and alternatives of the procedures and conscious  sedation were explained to the patient and informed consent was obtained.  Cardiac catheterization performed electively.  Local anesthetic given. Left radial artery access. Local anesthetic given.  A 5 Fr. Radial Glidesheath Slender was inserted in the vessel, utilizing  the modified Seldinger technique. Left coronary artery angiography. The  vessel was injected utilizing a 4 Fr JL 4.0 catheter and positioned in the  vessel ostia under fluoroscopic guidance. Angiography was performed in  multiple projections using hand-injection of contrast. Right coronary  artery angiography. The vessel was injected utilizing a 5fr FR 4 Expo  catheter and positioned in the vessel ostia under fluoroscopic guidance.  Angiography was performed in multiple projections using hand-injection of  contrast. RADIATION EXPOSURE: 11.7 min.  CONTRAST GIVEN: 88 ml Optiray.  MEDICATIONS GIVEN: Fentanyl, 25 mcg, IV. Midazolam, 1 mg, IV. Heparin, 3000  units, IV. 2% Lidocaine, 3 ml, subcutaneously. Cardene, 400 mcg. 0.9%  Normal saline, 50 ml, IV.  VENTRICLES: No left ventriculogram was performed.  CORONARY VESSELS: The coronary circulation is right dominant.  LM:   --  LM: Normal.  LAD:   --  LAD: Angiography showed minor luminal irregularities with no  flow limiting lesions.  --  D1: Normal.  CX:   --  Circumflex: Normal.  RI:   --  Ramus intermedius: Normal.  RCA:   --  RCA: Normal.  COMPLICATIONS: There were no complications.  DIAGNOSTIC IMPRESSIONS: There is mild irregularity of the coronary anatomy.  DIAGNOSTIC RECOMMENDATIONS: Medical management  INTERVENTIONALRECOMMENDATIONS: Medical management  Prepared and signed by  Feli Nogueira M.D.  Signed 10/17/2018 18:42:55  HEMODYNAMIC TABLES  Outputs:  Baseline  Outputs:  -- CALCULATIONS[age]  Outputs:  -- CALCULATIONS: Body Surface Area: 1.76  Outputs:  -- CALCULATIONS: Height in cm: 154.00  Outputs:  -- CALCULATIONS: Sex: Female  Outputs:  -- CALCULATIONS: Weight in k.10  Outputs:  -- OUTPUTS: O2 consumption: 220.60  Outputs:  -- OUTPUTS: Vo2 Indexed: 125.00    < end of copied text >      Imaging:    CXR: Personally reviewed: no acute cardiopulmonary pathology, lungs clear.    Labs: Personally reviewed                        9.4    9.37  )-----------( 333      ( 14 Oct 2019 06:05 )             31.7     10-14    140  |  104  |  13  ----------------------------<  84  4.1   |  25  |  0.56    Ca    9.5      14 Oct 2019 06:05  Phos  3.3     10-14  Mg     1.9     10-14    TPro  7.6  /  Alb  4.5  /  TBili  < 0.2<L>  /  DBili  x   /  AST  17  /  ALT  17  /  AlkPhos  79  10-14      Total Cholesterol: 176  LDL: 117  HDL: 55  T    Hemoglobin A1C, Whole Blood: 7.4 % (10-14 @ 06:05)    Thyroid Stimulating Hormone, Serum: 2.79 uIU/mL (10-14 @ 06:05)    Troponin: <6 --> <6

## 2019-10-14 NOTE — H&P ADULT - NSHPREVIEWOFSYSTEMS_GEN_ALL_CORE
REVIEW OF SYSTEMS:    CONSTITUTIONAL: No weakness, fevers or chills  EYES: No visual changes or eye discharge  ENT: No rhinorrhea or sore throat  NECK: No pain or stiffness  RESPIRATORY: No cough, wheezing, hemoptysis; No shortness of breath  CARDIOVASCULAR: +chest pain, L sided, pressure like, +SOB, no palpitations, no LE edema  GASTROINTESTINAL: No abdominal or epigastric pain. No nausea, vomiting, or hematemesis; No diarrhea or constipation. No melena or hematochezia.  BACK: No back pain, no flank pain  GENITOURINARY: No dysuria, frequency or hematuria  NEUROLOGICAL: No numbness or weakness  SKIN: No itching, burning, rashes, or lesions

## 2019-10-14 NOTE — ED ADULT NURSE NOTE - OBJECTIVE STATEMENT
Pt received to room 28. Pt comes to ED c/o of dull, non-radiating  chest pain to the epigastric region starting at 4pm yesterday. Pt also endorses dyspnea, worse on exertion. Pt states "it feels like gas but I wanted to make sure I wasn't having a heart attack." Additionally, pt reports having "periods of dizziness" while sitting on the cough, states "it felt like the couch was moving under me." Denies dizziness at this time. Respirations are even & unlabored, lung sounds clear, heart sounds normal, abd is soft, non-distended. Denies N/V/D, chills, fever, weakness, dizziness, headache, cough. Pt has hx of DM, HTN, HLD, PAD. Reports taking her HTN medication 3.5 hours ago. Pt is A&Ox4, ambulates independently. 20 G IV placed to right AC. Son & Daughter at bedside.

## 2019-10-15 ENCOUNTER — TRANSCRIPTION ENCOUNTER (OUTPATIENT)
Age: 59
End: 2019-10-15

## 2019-10-15 VITALS
DIASTOLIC BLOOD PRESSURE: 70 MMHG | RESPIRATION RATE: 17 BRPM | HEART RATE: 72 BPM | OXYGEN SATURATION: 99 % | TEMPERATURE: 99 F | SYSTOLIC BLOOD PRESSURE: 150 MMHG

## 2019-10-15 LAB
ALBUMIN SERPL ELPH-MCNC: 3.9 G/DL — SIGNIFICANT CHANGE UP (ref 3.3–5)
ALP SERPL-CCNC: 74 U/L — SIGNIFICANT CHANGE UP (ref 40–120)
ALT FLD-CCNC: 18 U/L — SIGNIFICANT CHANGE UP (ref 4–33)
ANION GAP SERPL CALC-SCNC: 12 MMO/L — SIGNIFICANT CHANGE UP (ref 7–14)
AST SERPL-CCNC: 16 U/L — SIGNIFICANT CHANGE UP (ref 4–32)
BILIRUB SERPL-MCNC: 0.3 MG/DL — SIGNIFICANT CHANGE UP (ref 0.2–1.2)
BUN SERPL-MCNC: 15 MG/DL — SIGNIFICANT CHANGE UP (ref 7–23)
CALCIUM SERPL-MCNC: 9.3 MG/DL — SIGNIFICANT CHANGE UP (ref 8.4–10.5)
CHLORIDE SERPL-SCNC: 104 MMOL/L — SIGNIFICANT CHANGE UP (ref 98–107)
CO2 SERPL-SCNC: 24 MMOL/L — SIGNIFICANT CHANGE UP (ref 22–31)
CREAT SERPL-MCNC: 0.62 MG/DL — SIGNIFICANT CHANGE UP (ref 0.5–1.3)
GLUCOSE SERPL-MCNC: 113 MG/DL — HIGH (ref 70–99)
HBA1C BLD-MCNC: 7.3 % — HIGH (ref 4–5.6)
HCT VFR BLD CALC: 31.8 % — LOW (ref 34.5–45)
HCV AB S/CO SERPL IA: 0.12 S/CO — SIGNIFICANT CHANGE UP (ref 0–0.99)
HCV AB SERPL-IMP: SIGNIFICANT CHANGE UP
HGB BLD-MCNC: 9.5 G/DL — LOW (ref 11.5–15.5)
MAGNESIUM SERPL-MCNC: 2 MG/DL — SIGNIFICANT CHANGE UP (ref 1.6–2.6)
MCHC RBC-ENTMCNC: 22.7 PG — LOW (ref 27–34)
MCHC RBC-ENTMCNC: 29.9 % — LOW (ref 32–36)
MCV RBC AUTO: 76.1 FL — LOW (ref 80–100)
NRBC # FLD: 0 K/UL — SIGNIFICANT CHANGE UP (ref 0–0)
PHOSPHATE SERPL-MCNC: 3.5 MG/DL — SIGNIFICANT CHANGE UP (ref 2.5–4.5)
PLATELET # BLD AUTO: 344 K/UL — SIGNIFICANT CHANGE UP (ref 150–400)
PMV BLD: 11 FL — SIGNIFICANT CHANGE UP (ref 7–13)
POTASSIUM SERPL-MCNC: 4.2 MMOL/L — SIGNIFICANT CHANGE UP (ref 3.5–5.3)
POTASSIUM SERPL-SCNC: 4.2 MMOL/L — SIGNIFICANT CHANGE UP (ref 3.5–5.3)
PROT SERPL-MCNC: 7.2 G/DL — SIGNIFICANT CHANGE UP (ref 6–8.3)
RBC # BLD: 4.18 M/UL — SIGNIFICANT CHANGE UP (ref 3.8–5.2)
RBC # FLD: 15.6 % — HIGH (ref 10.3–14.5)
SODIUM SERPL-SCNC: 140 MMOL/L — SIGNIFICANT CHANGE UP (ref 135–145)
WBC # BLD: 8.39 K/UL — SIGNIFICANT CHANGE UP (ref 3.8–10.5)
WBC # FLD AUTO: 8.39 K/UL — SIGNIFICANT CHANGE UP (ref 3.8–10.5)

## 2019-10-15 PROCEDURE — 93306 TTE W/DOPPLER COMPLETE: CPT | Mod: 26

## 2019-10-15 PROCEDURE — 93016 CV STRESS TEST SUPVJ ONLY: CPT | Mod: GC

## 2019-10-15 PROCEDURE — 93018 CV STRESS TEST I&R ONLY: CPT | Mod: GC

## 2019-10-15 PROCEDURE — 78452 HT MUSCLE IMAGE SPECT MULT: CPT | Mod: 26

## 2019-10-15 RX ADMIN — ATENOLOL 50 MILLIGRAM(S): 25 TABLET ORAL at 06:14

## 2019-10-15 RX ADMIN — Medication 1: at 12:15

## 2019-10-15 RX ADMIN — Medication 75 MICROGRAM(S): at 06:14

## 2019-10-15 RX ADMIN — LOSARTAN POTASSIUM 50 MILLIGRAM(S): 100 TABLET, FILM COATED ORAL at 06:14

## 2019-10-15 RX ADMIN — Medication 81 MILLIGRAM(S): at 12:15

## 2019-10-15 RX ADMIN — PANTOPRAZOLE SODIUM 40 MILLIGRAM(S): 20 TABLET, DELAYED RELEASE ORAL at 06:14

## 2019-10-15 NOTE — DISCHARGE NOTE PROVIDER - HOSPITAL COURSE
This is a 59F with history as above who presents to the hospital with complaints of L sided chest pain of unclear etiology.           Chest pain    - house cardiology cs    - trop <6 x2    - TTE EF 67%     - ASA, statin     - stress test normal         Leukocytosis - resolved     - no infectious complaints    - suspect this is reactionary 2/2 acute stress    - monitor off abx for now        DM2    - hgba1c 7.3    - lantus, sliding scale         HTN, HLD     - atenolol, losartan, atorvastatin         hypothyroidism     - synthroid     - TSH WNL         d/c home

## 2019-10-15 NOTE — DISCHARGE NOTE NURSING/CASE MANAGEMENT/SOCIAL WORK - PATIENT PORTAL LINK FT
You can access the FollowMyHealth Patient Portal offered by [facility] by registering at the following website: [email or web address]. By joining [facility]’s FollowMyHealth portal, you will also be able to view your health information using other applications (apps) compatible with our system.

## 2019-10-15 NOTE — DISCHARGE NOTE PROVIDER - CARE PROVIDER_API CALL
Lisa Chatterjee)  Internal Medicine  [address] [place] [address]  Phone: [phone]  Fax: [phone]  Follow Up Time:
0 = swallows foods/liquids without difficulty

## 2019-10-15 NOTE — DISCHARGE NOTE PROVIDER - NSDCCPCAREPLAN_GEN_ALL_CORE_FT
PRINCIPAL DISCHARGE DIAGNOSIS  Diagnosis: Chest pain  Assessment and Plan of Treatment: You had a stress test done and it was normal. Follow up with Dr Chatterjee in 1 week, please call to make an appointment and let them know you have been in the hospital recently.      SECONDARY DISCHARGE DIAGNOSES  Diagnosis: Essential hypertension  Assessment and Plan of Treatment: Continue your home medications.    Diagnosis: Type 2 diabetes mellitus without complication, with long-term current use of insulin  Assessment and Plan of Treatment: Your hgba1c = 7.3. Continue your home diabetic medications.    Diagnosis: Hypercholesterolemia  Assessment and Plan of Treatment: Continue your cholesterol medication.    Diagnosis: Hypothyroidism, unspecified type  Assessment and Plan of Treatment: Your TSH is normal. Continue synthroid.

## 2019-10-16 DIAGNOSIS — Z71.89 OTHER SPECIFIED COUNSELING: ICD-10-CM

## 2019-11-12 ENCOUNTER — OUTPATIENT (OUTPATIENT)
Dept: OUTPATIENT SERVICES | Facility: HOSPITAL | Age: 59
LOS: 1 days | End: 2019-11-12
Payer: MEDICAID

## 2019-11-12 ENCOUNTER — APPOINTMENT (OUTPATIENT)
Dept: MAMMOGRAPHY | Facility: IMAGING CENTER | Age: 59
End: 2019-11-12
Payer: MEDICAID

## 2019-11-12 DIAGNOSIS — Z98.89 OTHER SPECIFIED POSTPROCEDURAL STATES: Chronic | ICD-10-CM

## 2019-11-12 DIAGNOSIS — Z00.8 ENCOUNTER FOR OTHER GENERAL EXAMINATION: ICD-10-CM

## 2019-11-12 PROCEDURE — 77063 BREAST TOMOSYNTHESIS BI: CPT | Mod: 26

## 2019-11-12 PROCEDURE — 77067 SCR MAMMO BI INCL CAD: CPT | Mod: 26

## 2019-11-12 PROCEDURE — 77067 SCR MAMMO BI INCL CAD: CPT

## 2019-11-12 PROCEDURE — 77063 BREAST TOMOSYNTHESIS BI: CPT

## 2019-11-26 ENCOUNTER — APPOINTMENT (OUTPATIENT)
Dept: MAMMOGRAPHY | Facility: IMAGING CENTER | Age: 59
End: 2019-11-26
Payer: MEDICAID

## 2019-11-26 ENCOUNTER — OUTPATIENT (OUTPATIENT)
Dept: OUTPATIENT SERVICES | Facility: HOSPITAL | Age: 59
LOS: 1 days | End: 2019-11-26
Payer: MEDICAID

## 2019-11-26 ENCOUNTER — APPOINTMENT (OUTPATIENT)
Dept: ULTRASOUND IMAGING | Facility: IMAGING CENTER | Age: 59
End: 2019-11-26
Payer: MEDICAID

## 2019-11-26 DIAGNOSIS — Z00.8 ENCOUNTER FOR OTHER GENERAL EXAMINATION: ICD-10-CM

## 2019-11-26 DIAGNOSIS — Z98.89 OTHER SPECIFIED POSTPROCEDURAL STATES: Chronic | ICD-10-CM

## 2019-11-26 PROCEDURE — 76642 ULTRASOUND BREAST LIMITED: CPT

## 2019-11-26 PROCEDURE — 77065 DX MAMMO INCL CAD UNI: CPT | Mod: 26,LT

## 2019-11-26 PROCEDURE — 76642 ULTRASOUND BREAST LIMITED: CPT | Mod: 26,LT

## 2019-11-26 PROCEDURE — G0279: CPT

## 2019-11-26 PROCEDURE — 77065 DX MAMMO INCL CAD UNI: CPT

## 2019-11-26 PROCEDURE — G0279: CPT | Mod: 26

## 2019-12-05 ENCOUNTER — APPOINTMENT (OUTPATIENT)
Dept: ULTRASOUND IMAGING | Facility: IMAGING CENTER | Age: 59
End: 2019-12-05
Payer: MEDICAID

## 2019-12-05 ENCOUNTER — RESULT REVIEW (OUTPATIENT)
Age: 59
End: 2019-12-05

## 2019-12-05 ENCOUNTER — OUTPATIENT (OUTPATIENT)
Dept: OUTPATIENT SERVICES | Facility: HOSPITAL | Age: 59
LOS: 1 days | End: 2019-12-05
Payer: MEDICAID

## 2019-12-05 DIAGNOSIS — Z00.8 ENCOUNTER FOR OTHER GENERAL EXAMINATION: ICD-10-CM

## 2019-12-05 DIAGNOSIS — Z98.89 OTHER SPECIFIED POSTPROCEDURAL STATES: Chronic | ICD-10-CM

## 2019-12-05 PROCEDURE — 19083 BX BREAST 1ST LESION US IMAG: CPT | Mod: LT

## 2019-12-05 PROCEDURE — 19083 BX BREAST 1ST LESION US IMAG: CPT

## 2019-12-05 PROCEDURE — 77065 DX MAMMO INCL CAD UNI: CPT | Mod: 26,LT

## 2019-12-05 PROCEDURE — 77065 DX MAMMO INCL CAD UNI: CPT

## 2019-12-05 PROCEDURE — A4648: CPT

## 2020-01-28 ENCOUNTER — OUTPATIENT (OUTPATIENT)
Dept: OUTPATIENT SERVICES | Facility: HOSPITAL | Age: 60
LOS: 1 days | End: 2020-01-28
Payer: MEDICAID

## 2020-01-28 ENCOUNTER — APPOINTMENT (OUTPATIENT)
Dept: RADIOLOGY | Facility: IMAGING CENTER | Age: 60
End: 2020-01-28
Payer: MEDICAID

## 2020-01-28 DIAGNOSIS — Z98.89 OTHER SPECIFIED POSTPROCEDURAL STATES: Chronic | ICD-10-CM

## 2020-01-28 DIAGNOSIS — Z00.8 ENCOUNTER FOR OTHER GENERAL EXAMINATION: ICD-10-CM

## 2020-01-28 PROCEDURE — 77080 DXA BONE DENSITY AXIAL: CPT

## 2020-01-28 PROCEDURE — 77080 DXA BONE DENSITY AXIAL: CPT | Mod: 26

## 2020-01-29 ENCOUNTER — APPOINTMENT (OUTPATIENT)
Dept: ORTHOPEDIC SURGERY | Facility: CLINIC | Age: 60
End: 2020-01-29
Payer: MEDICAID

## 2020-01-29 VITALS
WEIGHT: 165 LBS | HEART RATE: 70 BPM | DIASTOLIC BLOOD PRESSURE: 78 MMHG | HEIGHT: 60 IN | SYSTOLIC BLOOD PRESSURE: 166 MMHG | BODY MASS INDEX: 32.39 KG/M2

## 2020-01-29 PROCEDURE — 72170 X-RAY EXAM OF PELVIS: CPT

## 2020-01-29 PROCEDURE — 73562 X-RAY EXAM OF KNEE 3: CPT | Mod: 50

## 2020-01-29 PROCEDURE — 99204 OFFICE O/P NEW MOD 45 MIN: CPT

## 2020-01-29 NOTE — PHYSICAL EXAM
[Antalgic] : antalgic [LE] : Sensory: Intact in bilateral lower extremities [Knee] : patellar 2+ and symmetric bilaterally [Ankle] : ankle 2+ and symmetric bilaterally [DP] : dorsalis pedis 2+ and symmetric bilaterally [PT] : posterior tibial 2+ and symmetric bilaterally [de-identified] : On general examination the patient is adequately groomed and nourished. The vital parameters are as recorded. \par There is no lymphedema or diffuse swelling, no varicose veins, no skin warmth/erythema/scars/swelling, no ulcers and no palpable lymph nodes or masses in both lower extremities. Bilateral pedal pulses are well palpable.\par Upper Extremity:\par Both right and left upper extremities are unremarkable in terms of skin rash, lesions, pigmentation, redness, tenderness, swelling, joint instability, abnormal deformity or crepitus. The overall range of motion, sensation, motor tone and strength testing are normal.\par \par Bilateral Knee Exam: \par The gait is bilateral stiff knee antalgic.\par Knee alignment:            Right neutral with no flexion deformity. \par Left 3 degrees varus with no flexion deformity.\par Left knee demonstrates no scars and the skin has no warmth, erythema, swelling or tenderness. \par RIght knee demonstrates well healed scar from TKR with no warmth, erythema, swelling, or tenderness. \par Both knees have a range of motion of\par Extension:                    Right 0 degrees     Left 0 degrees\par Flexion:                                   Right 120 degrees      Left 110 degrees\par There is left knee medial joint line tenderness. There is left knee mild effusion. \par Rosa's test is positive. Heaven test is positive.\par Lachman's test, Anterior/Posterior Drawer test and Pivot Shift Tests are negative. \par There is bilateral knee grade 1 MCL mediolateral laxity and no anteroposterior instability. \par Patella compression test is positive and patellofemoral tracking is normal with no lateral subluxation, apprehension or instability. \par Right knee quadriceps and hamstrings power is 4+.\par Left knee quadriceps and hamstrings power is 4+.\par \par Hip Exam:\par The gait and station is normal\par The patient has equal leg lengths and no pelvic tilt. Kishore/Erich test is 7 inches on the right and 7 inches on the left. Active SLR is 60 degrees on the right and 60 degrees on the left. Both hips demonstrate no scars and the skin has no signs of inflammation or tenderness. \par Both Hips have a normal range of motion of flexion to 100 degrees, abduction 40 degrees, adduction 20 degrees, external rotation 40 degrees, internal rotation 20 degrees with symmetrical motion in flexion and extension. There is no flexion contracture, deformity or instability. Labral impingement tests are negative.\par Both hips flexor, abductor and extensor power is normal. [de-identified] : The following radiographs were ordered and read by me during this patients visit. I reviewed each radiograph in detail with the patient and discussed the findings as highlighted below. \par AP, lateral and skyline views of the bilateral knees confirm left knee advanced degenerative joint disease with medial joint space narrowing and patellar osteophyte formation, left knee reveals well fixed and aligned left TKR with no signs of mechanical failure or periprosthetic fracture. \par AP view of the pelvis are within normal limits

## 2020-01-29 NOTE — DISCUSSION/SUMMARY
[de-identified] : Left Knee DJD with Probable Medial Meniscus Tear, Stable Right TKR with scar tissue irritation. \par \par The natural history and treatment of degenerative arthritis was discussed with the patient at length today. The spectrum of treatment including nonoperative modalities to surgical intervention was elucidated. Noninvasive and nonoperative treatment modalities include weight reduction, activity modification with low impact exercise,  as needed use of acetaminophen or anti-inflammatory medications if tolerated, glucosamine/chondroitin supplements, and physical therapy. Further treatments can include corticosteroid injection and the use of viscosupplementation with hyaluronic acid injections. Definitive surgical treatment can certainly include total joint arthroplasty also. The risks and benefits of each treatment options was discussed and all questions were answered.\par \par The patient was informed of the findings and recommended conservative management in the form of a home exercise program, activity modifications, stationary bicycling, swimming and weight loss program. A trial of Glucosamine and Chondroiten Sulphate was recommended.\par A prescription for a course of physical therapy was provided.\par A prescription for non-steroidal anti-inflammatory medication Diclofenac was provided and the risks, benefits and side effects were discussed.\par I have recommended an MRI of the Left knee to evaluate for medial meniscus tear. \par Follow up has been recommended following the MRI to review.

## 2020-01-29 NOTE — HISTORY OF PRESENT ILLNESS
[8] : an average pain level of 8/10 [de-identified] : Ms. GARCIA BARROSO is a 59 year old female, Status post right knee unicompartmental arthroplasty, status post conversion to right total knee replacement 2.5 years ago with Dr Acosta at [facility]. She is presenting with bilateral knee pain, now worsening. She localizes the pain to the medial aspect of the right knee, as well as the medial and anterior aspect of the left knee. The patient describes the pain as sharp, and states it is intermittent, based on activity. She states the pain is exacerbated by walking long distance, climbing/descending stairs, and rising from a seated position. She has been taking NSAIDs for pain with only mild temporary relief. She admits to prior conservative management inclusive of physical therapy with only mild improvement in condition. Patient has not had injections. She denies hip or lower back pain. The patient admits to limitations in their quality of life, and is present to discuss options for treatment. YEN.

## 2020-01-29 NOTE — CONSULT LETTER
[Dear  ___] : Dear  [unfilled], [Consult Letter:] : I had the pleasure of evaluating your patient, [unfilled]. [Please see my note below.] : Please see my note below. [Consult Closing:] : Thank you very much for allowing me to participate in the care of this patient.  If you have any questions, please do not hesitate to contact me. [Sincerely,] : Sincerely, [FreeTextEntry2] : RON SOLARES  [FreeTextEntry3] : Sander Bell MD\par \par ______________________________________________\par [facility]: Hip/Knee Arthroplasty\par [address], [facility] [address], [place] [address]\par (t) [phone]\par (f) [phone]

## 2020-03-25 ENCOUNTER — APPOINTMENT (OUTPATIENT)
Dept: ORTHOPEDIC SURGERY | Facility: CLINIC | Age: 60
End: 2020-03-25

## 2020-03-31 ENCOUNTER — RX RENEWAL (OUTPATIENT)
Age: 60
End: 2020-03-31

## 2020-05-20 ENCOUNTER — APPOINTMENT (OUTPATIENT)
Dept: ORTHOPEDIC SURGERY | Facility: CLINIC | Age: 60
End: 2020-05-20
Payer: MEDICAID

## 2020-05-20 ENCOUNTER — APPOINTMENT (OUTPATIENT)
Dept: ORTHOPEDIC SURGERY | Facility: CLINIC | Age: 60
End: 2020-05-20

## 2020-05-20 VITALS
WEIGHT: 167 LBS | TEMPERATURE: 97.6 F | DIASTOLIC BLOOD PRESSURE: 69 MMHG | BODY MASS INDEX: 32.79 KG/M2 | HEIGHT: 60 IN | HEART RATE: 73 BPM | SYSTOLIC BLOOD PRESSURE: 164 MMHG

## 2020-05-20 DIAGNOSIS — S83.8X2A SPRAIN OF OTHER SPECIFIED PARTS OF LEFT KNEE, INITIAL ENCOUNTER: ICD-10-CM

## 2020-05-20 PROCEDURE — 99213 OFFICE O/P EST LOW 20 MIN: CPT

## 2020-05-20 NOTE — CONSULT LETTER
[Dear  ___] : Dear  [unfilled], [Consult Letter:] : I had the pleasure of evaluating your patient, [unfilled]. [Consult Closing:] : Thank you very much for allowing me to participate in the care of this patient.  If you have any questions, please do not hesitate to contact me. [Sincerely,] : Sincerely, [FreeTextEntry2] : RON SOLARES  [FreeTextEntry1] : Left Knee DJD with Complex Medial Meniscus Tear, Stable Right TKR with scar tissue irritation. \par \par The natural history and treatment of degenerative arthritis was discussed with the patient at length today. The spectrum of treatment including nonoperative modalities to surgical intervention was elucidated. Noninvasive and nonoperative treatment modalities include weight reduction, activity modification with low impact exercise,  as needed use of acetaminophen or anti-inflammatory medications if tolerated, glucosamine/chondroitin supplements, and physical therapy. Further treatments can include corticosteroid injection and the use of viscosupplementation with hyaluronic acid injections. Definitive surgical treatment can certainly include total joint arthroplasty also. The risks and benefits of each treatment options was discussed and all questions were answered.\par \par Patient will at some point require left TKR but wishes to hold off at this time. \par The patient was informed of the findings and recommended conservative management in the form of a home exercise program, activity modifications, stationary bicycling, swimming and weight loss program. A trial of Glucosamine and Chondroiten Sulphate was recommended.\par A prescription for a course of physical therapy was provided.\par A prescription for non-steroidal anti-inflammatory medication Nabumetone was provided and the risks, benefits and side effects were discussed.\par Patient was offered gel injections but declines at this time. \par Follow up was recommended in 3 months.   [FreeTextEntry3] : Sander Bell MD\par \par ______________________________________________\par [facility]: Hip/Knee Arthroplasty\par [address], [facility] [address], [place] [address]\par (t) [phone]\par (f) [phone]

## 2020-05-20 NOTE — PHYSICAL EXAM
[Antalgic] : antalgic [LE] : 5/5 motor strength in bilateral lower extremities [Ankle] : ankle 2+ and symmetric bilaterally [Knee] : patellar 2+ and symmetric bilaterally [DP] : dorsalis pedis 2+ and symmetric bilaterally [PT] : posterior tibial 2+ and symmetric bilaterally [de-identified] : On general examination the patient is adequately groomed and nourished. The vital parameters are as recorded. \par There is no lymphedema or diffuse swelling, no varicose veins, no skin warmth/erythema/scars/swelling, no ulcers and no palpable lymph nodes or masses in both lower extremities. Bilateral pedal pulses are well palpable.\par Upper Extremity:\par Both right and left upper extremities are unremarkable in terms of skin rash, lesions, pigmentation, redness, tenderness, swelling, joint instability, abnormal deformity or crepitus. The overall range of motion, sensation, motor tone and strength testing are normal.\par \par Bilateral Knee Exam: \par The gait is bilateral stiff knee antalgic.\par Knee alignment:            Right neutral with no flexion deformity. \par Left 3 degrees varus with no flexion deformity.\par Left knee demonstrates no scars and the skin has no warmth, erythema, swelling or tenderness. \par RIght knee demonstrates well healed scar from TKR with no warmth, erythema, swelling, or tenderness. \par Both knees have a range of motion of\par Extension:                    Right 0 degrees     Left 0 degrees\par Flexion:                                   Right 120 degrees      Left 110 degrees\par There is left knee medial joint line tenderness. There is left knee mild effusion. \par Rosa's test is positive. Heaven test is positive.\par Lachman's test, Anterior/Posterior Drawer test and Pivot Shift Tests are negative. \par There is bilateral knee grade 1 MCL mediolateral laxity and no anteroposterior instability. \par Patella compression test is positive and patellofemoral tracking is normal with no lateral subluxation, apprehension or instability. \par Right knee quadriceps and hamstrings power is 4+.\par Left knee quadriceps and hamstrings power is 4+.\par \par Hip Exam:\par The gait and station is normal\par The patient has equal leg lengths and no pelvic tilt. Kishore/Erich test is 7 inches on the right and 7 inches on the left. Active SLR is 60 degrees on the right and 60 degrees on the left. Both hips demonstrate no scars and the skin has no signs of inflammation or tenderness. \par Both Hips have a normal range of motion of flexion to 100 degrees, abduction 40 degrees, adduction 20 degrees, external rotation 40 degrees, internal rotation 20 degrees with symmetrical motion in flexion and extension. There is no flexion contracture, deformity or instability. Labral impingement tests are negative.\par Both hips flexor, abductor and extensor power is normal. 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  \par \par The following radiographs were ordered last visit and read by me during this patients visit. I reviewed each radiograph in detail with the patient and discussed the findings as highlighted below. \par AP, lateral and skyline views of the bilateral knees confirm left knee advanced degenerative joint disease with medial joint space narrowing and patellar osteophyte formation, left knee reveals well fixed and aligned left TKR with no signs of mechanical failure or periprosthetic fracture. \par AP view of the pelvis are within normal limits\par \par

## 2020-05-20 NOTE — DISCUSSION/SUMMARY
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 \par \par The natural history and treatment of degenerative arthritis was discussed with the patient at length today. The spectrum of treatment including nonoperative modalities to surgical intervention was elucidated. Noninvasive and nonoperative treatment modalities include weight reduction, activity modification with low impact exercise,  as needed use of acetaminophen or anti-inflammatory medications if tolerated, glucosamine/chondroitin supplements, and physical therapy. Further treatments can include corticosteroid injection and the use of viscosupplementation with hyaluronic acid injections. Definitive surgical treatment can certainly include total joint arthroplasty also. The risks and benefits of each treatment options was discussed and all questions were answered.\par \par Patient will at some point require left TKR but wishes to hold off at this time. \par The patient was informed of the findings and recommended conservative management in the form of a home exercise program, activity modifications, stationary bicycling, swimming and weight loss program. A trial of Glucosamine and Chondroiten Sulphate was recommended.\par A prescription for a course of physical therapy was provided.\par A prescription for non-steroidal anti-inflammatory medication Nabumetone was provided and the risks, benefits and side effects were discussed.\par Patient was offered gel injections but declines at this time. \par Follow up was recommended in 3 months.

## 2020-05-20 NOTE — REVIEW OF SYSTEMS
[Arthralgia] : arthralgia [Joint Stiffness] : joint stiffness [Joint Pain] : joint pain [Joint Swelling] : joint swelling [Negative] : Endocrine

## 2020-05-20 NOTE — HISTORY OF PRESENT ILLNESS
[8] : an average pain level of 8/10 [de-identified] : Ms. GARCIA BARROSO is a 59 year old female, Status post right knee unicompartmental arthroplasty, status post conversion to right total knee replacement 2.5 years ago with Dr Acosta at [facility]. She is presenting with bilateral knee pain, now worsening. She localizes the pain to the medial aspect of the right knee, as well as the medial and anterior aspect of the left knee. The patient describes the pain as sharp, and states it is intermittent, based on activity. She states the pain is exacerbated by walking long distance, climbing/descending stairs, and rising from a seated position. She has been taking NSAIDs for pain with only mild temporary relief. She admits to prior conservative management inclusive of physical therapy with only mild improvement in condition. Patient has not had injections. She denies hip or lower back pain. Patient was seen in office with the same issues in January of 2020 and was sent for MRI to evaluate left knee for DJD and medial Meniscus Tear. Her knee pain is not improving. She presents today for MRI follow up. YEN.

## 2020-06-03 ENCOUNTER — APPOINTMENT (OUTPATIENT)
Dept: ORTHOPEDIC SURGERY | Facility: CLINIC | Age: 60
End: 2020-06-03
Payer: MEDICAID

## 2020-06-03 VITALS
BODY MASS INDEX: 32.79 KG/M2 | SYSTOLIC BLOOD PRESSURE: 152 MMHG | HEIGHT: 60 IN | WEIGHT: 167 LBS | TEMPERATURE: 96.9 F | DIASTOLIC BLOOD PRESSURE: 84 MMHG | HEART RATE: 73 BPM

## 2020-06-03 DIAGNOSIS — Z96.651 PRESENCE OF RIGHT ARTIFICIAL KNEE JOINT: ICD-10-CM

## 2020-06-03 PROCEDURE — 99213 OFFICE O/P EST LOW 20 MIN: CPT

## 2020-06-03 NOTE — HISTORY OF PRESENT ILLNESS
[Worsening] : worsening [de-identified] : Ms. GARCIA BARROSO is a 59 year old female, Status post right knee unicompartmental arthroplasty, status post conversion to right total knee replacement 2.5 years ago with Dr Acosta at [facility]. She is presenting with bilateral knee pain, now worsening. She localizes the pain to the medial aspect of the right knee, as well as the medial and anterior aspect of the left knee. The patient describes the pain as sharp, and states it is intermittent, based on activity. She states the pain is exacerbated by walking long distance, climbing/descending stairs, and rising from a seated position. She has been taking NSAIDs for pain with only mild temporary relief. She admits to prior conservative management inclusive of physical therapy with only mild improvement in condition. Patient has not had injections. She denies hip or lower back pain. Patient was seen in office with the same issues in January of 2020 and was sent for MRI to evaluate left knee for DJD and medial Meniscus Tear. She was then seen and evalauted end of May 2020, and recommended conservative management. She presented for review of her MRI during the 5/20/20 visit, but did not have the images at that time. She presents today to review the imaging, and to consider injection therapy, as her pain is not improving with use of NSAIDs and home exercise/PT.

## 2020-06-03 NOTE — DISCUSSION/SUMMARY
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 \par \par The natural history and treatment of degenerative arthritis was discussed with the patient at length today. The spectrum of treatment including nonoperative modalities to surgical intervention was elucidated. Noninvasive and nonoperative treatment modalities include weight reduction, activity modification with low impact exercise,  as needed use of acetaminophen or anti-inflammatory medications if tolerated, glucosamine/chondroitin supplements, and physical therapy. Further treatments can include corticosteroid injection and the use of viscosupplementation with hyaluronic acid injections. Definitive surgical treatment can certainly include total joint arthroplasty also. The risks and benefits of each treatment options was discussed and all questions were answered.\par \par Patient will at some point require left TKR but wishes to hold off at this time. \par The patient was informed of the findings and recommended conservative management in the form of a home exercise program, activity modifications, stationary bicycling, swimming and weight loss program. She has been recommended refrain from high impact activity. A trial of Glucosamine and Chondroiten Sulphate was recommended.\par I have recommended Euflexxa injections to the left knee and the patient agreed to proceed, and the risks, benefits and side effects were discussed.Follow-up appointment was recommended once the injection is received in the office to begin the series\par In the interim, she will continue with PT as well as use of diclofenac. These treatments in conjuction with the injections will hopefully provide relief of her symptoms.

## 2020-06-03 NOTE — REASON FOR VISIT
[FreeTextEntry2] : bilateral knee pain  [Follow-Up Visit] : a follow-up visit for [Knee Pain] : knee pain

## 2020-06-03 NOTE — CONSULT LETTER
[Please see my note below.] : Please see my note below. [FreeTextEntry2] : RON SOLARES  [FreeTextEntry3] : Sander Bell MD\par \par ______________________________________________\par [facility]: Hip/Knee Arthroplasty\par [address] [facility] [address], [facility] [address], [place] [address]\par (t) [phone]\par (f) [phone]

## 2020-07-16 ENCOUNTER — RX RENEWAL (OUTPATIENT)
Age: 60
End: 2020-07-16

## 2020-08-06 ENCOUNTER — FORM ENCOUNTER (OUTPATIENT)
Age: 60
End: 2020-08-06

## 2020-08-09 ENCOUNTER — FORM ENCOUNTER (OUTPATIENT)
Age: 60
End: 2020-08-09

## 2020-09-09 ENCOUNTER — APPOINTMENT (OUTPATIENT)
Dept: ORTHOPEDIC SURGERY | Facility: CLINIC | Age: 60
End: 2020-09-09

## 2020-09-16 ENCOUNTER — APPOINTMENT (OUTPATIENT)
Dept: ORTHOPEDIC SURGERY | Facility: CLINIC | Age: 60
End: 2020-09-16
Payer: SELF-PAY

## 2020-09-16 VITALS
BODY MASS INDEX: 32.79 KG/M2 | WEIGHT: 167 LBS | HEIGHT: 60 IN | DIASTOLIC BLOOD PRESSURE: 79 MMHG | SYSTOLIC BLOOD PRESSURE: 166 MMHG | RESPIRATION RATE: 68 BRPM

## 2020-09-16 PROCEDURE — 99212 OFFICE O/P EST SF 10 MIN: CPT | Mod: 25

## 2020-09-16 PROCEDURE — 20610 DRAIN/INJ JOINT/BURSA W/O US: CPT | Mod: LT

## 2020-09-16 NOTE — PHYSICAL EXAM
[Antalgic] : antalgic [Knee] : patellar 2+ and symmetric bilaterally [LE] : Sensory: Intact in bilateral lower extremities [DP] : dorsalis pedis 2+ and symmetric bilaterally [Ankle] : ankle 2+ and symmetric bilaterally [PT] : posterior tibial 2+ and symmetric bilaterally [de-identified] : On general examination the patient is adequately groomed and nourished. The vital parameters are as recorded. \par There is no lymphedema or diffuse swelling, no varicose veins, no skin warmth/erythema/scars/swelling, no ulcers and no palpable lymph nodes or masses in both lower extremities. Bilateral pedal pulses are well palpable.\par Upper Extremity:\par Both right and left upper extremities are unremarkable in terms of skin rash, lesions, pigmentation, redness, tenderness, swelling, joint instability, abnormal deformity or crepitus. The overall range of motion, sensation, motor tone and strength testing are normal.\par \par Bilateral Knee Exam: \par The gait is bilateral stiff knee antalgic.\par Knee alignment:            Right neutral with no flexion deformity. \par Left 3 degrees varus with no flexion deformity.\par Left knee demonstrates no scars and the skin has no warmth, erythema, swelling or tenderness. \par RIght knee demonstrates well healed scar from TKR with no warmth, erythema, swelling, or tenderness. \par Both knees have a range of motion of\par Extension:                    Right 0 degrees     Left 0 degrees\par Flexion:                                   Right 120 degrees      Left 110 degrees\par There is left knee medial joint line tenderness. There is left knee mild effusion. \par Rosa's test is positive. Heaven test is positive.\par Lachman's test, Anterior/Posterior Drawer test and Pivot Shift Tests are negative. \par There is bilateral knee grade 1 MCL mediolateral laxity and no anteroposterior instability. \par Patella compression test is positive and patellofemoral tracking is normal with no lateral subluxation, apprehension or instability. \par Right knee quadriceps and hamstrings power is 4+.\par Left knee quadriceps and hamstrings power is 4+.\par \par Hip Exam:\par The gait and station is normal\par The patient has equal leg lengths and no pelvic tilt. Kishore/Erich test is 7 inches on the right and 7 inches on the left. Active SLR is 60 degrees on the right and 60 degrees on the left. Both hips demonstrate no scars and the skin has no signs of inflammation or tenderness. \par Both Hips have a normal range of motion of flexion to 100 degrees, abduction 40 degrees, adduction 20 degrees, external rotation 40 degrees, internal rotation 20 degrees with symmetrical motion in flexion and extension. There is no flexion contracture, deformity or instability. Labral impingement tests are negative.\par Both hips flexor, abductor and extensor power is normal. 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  \par The MRI images were now provided for review, and the images were discussed with the patient. There is insufficiency fracture of the medial tibial plateau, reactive bone edema. \par \par The following radiographs were ordered last visit and read by me during this patients visit. I reviewed each radiograph in detail with the patient and discussed the findings as highlighted below. \par AP, lateral and skyline views of the bilateral knees confirm left knee advanced degenerative joint disease with medial joint space narrowing and patellar osteophyte formation, left knee reveals well fixed and aligned left TKR with no signs of mechanical failure or periprosthetic fracture. \par AP view of the pelvis are within normal limits\par \par

## 2020-09-16 NOTE — HISTORY OF PRESENT ILLNESS
[Worsening] : worsening [8] : a current pain level of 8/10 [de-identified] : Ms. GARCIA BARROSO is a 59 year old female, Status post right knee unicompartmental arthroplasty, status post conversion to right total knee replacement 2.5 years ago with Dr Acosta at [facility]. She is presenting with worsening left knee pain. The patient describes the pain as sharp, and states it is intermittent, based on activity. She states the pain is exacerbated by walking long distance, climbing/descending stairs, and rising from a seated position. She has been taking NSAIDs for pain with only mild temporary relief. She admits to prior conservative management inclusive of physical therapy with only mild improvement in condition. Patient has not had injections. She denies hip or lower back pain. Patient was seen in office with the same issues in January of 2020 and was sent for MRI to evaluate left knee for DJD and medial Meniscus Tear. She was then seen and evalauted end of May 2020, and recommended conservative management. She presents to consider out of pocket Euflexxa injections.

## 2020-09-16 NOTE — PROCEDURE
[de-identified] : Discussed at length with the patient the planned Euflexxa injection. The risks, benefits, convalescence and alternatives were reviewed. The possible side effects discussed included but were not limited to: pain, swelling, heat and redness. There symptoms are generally mild but if they are extensive then contact the office. Giving pain relievers by mouth such as NSAID’s or Tylenol can generally treat the reactions to Euflexxa. Rare cases of infection have been noted. Rash, hives and itching may occur post injection. If you have muscle pain or cramps, flushing and or swelling of the face, rapid heart beat, nausea, dizziness, fever, chills, headache, difficulty breathing, swelling in the arms or legs, or have a prickly feeling of your skin, contact a health care provider immediately.\par \par Following this discussion, the left knee was prepped with alcohol and under sterile condition the Euflexxa injection was performed with a 21 gauge needle. The needle was introduced into the joint, aspiration was performed to ensure intra-articular placement and the medication was injected. Upon withdrawal of the needle the site was cleaned with alcohol and a bandaid applied. The patient tolerated the injection well and there were no adverse effects. Post injection instructions included no strenuous activity for 24 hours, cryotherapy and if there are any adverse effects to contact the office.\par \par The patient received the first Euflexxa injection to the left knee and tolerated well. \par The patient has been instructed to ice and elevate to alleviate/reduce swelling. \par follow up appointment recommended next week for the second injection to the left knee.

## 2020-09-16 NOTE — CONSULT LETTER
[Consult Letter:] : I had the pleasure of evaluating your patient, [unfilled]. [Dear  ___] : Dear  [unfilled], [Please see my note below.] : Please see my note below. [Consult Closing:] : Thank you very much for allowing me to participate in the care of this patient.  If you have any questions, please do not hesitate to contact me. [Sincerely,] : Sincerely, [FreeTextEntry2] : RON SOLARES  [FreeTextEntry3] : Sander Bell MD\par \par ______________________________________________\par [facility]: Hip/Knee Arthroplasty\par [address] [facility], [facility] [address], [place] [address]\par (t) [phone]\par (f) [phone]

## 2020-09-22 ENCOUNTER — APPOINTMENT (OUTPATIENT)
Dept: ORTHOPEDIC SURGERY | Facility: CLINIC | Age: 60
End: 2020-09-22
Payer: MEDICAID

## 2020-09-22 PROCEDURE — 20610 DRAIN/INJ JOINT/BURSA W/O US: CPT | Mod: LT

## 2020-09-22 PROCEDURE — 20610 DRAIN/INJ JOINT/BURSA W/O US: CPT

## 2020-09-22 NOTE — PROCEDURE
[de-identified] : Discussed at length with the patient the planned Euflexxa injection. The risks, benefits, convalescence and alternatives were reviewed. The possible side effects discussed included but were not limited to: pain, swelling, heat and redness. There symptoms are generally mild but if they are extensive then contact the office. Giving pain relievers by mouth such as NSAID’s or Tylenol can generally treat the reactions to Euflexxa. Rare cases of infection have been noted. Rash, hives and itching may occur post injection. If you have muscle pain or cramps, flushing and or swelling of the face, rapid heart beat, nausea, dizziness, fever, chills, headache, difficulty breathing, swelling in the arms or legs, or have a prickly feeling of your skin, contact a health care provider immediately.\par \par Following this discussion, the left knee was prepped with alcohol and under sterile condition the Euflexxa injection was performed with a 21 gauge needle. The needle was introduced into the joint, aspiration was performed to ensure intra-articular placement and the medication was injected. Upon withdrawal of the needle the site was cleaned with alcohol and a bandaid applied. The patient tolerated the injection well and there were no adverse effects. Post injection instructions included no strenuous activity for 24 hours, cryotherapy and if there are any adverse effects to contact the office.\par \par The patient received the second Euflexxa injection to the left knee and tolerated well. \par The patient has been instructed to ice and elevate to alleviate/reduce swelling. \par follow up appointment recommended next week for the third injection to the left knee.

## 2020-09-28 ENCOUNTER — RX RENEWAL (OUTPATIENT)
Age: 60
End: 2020-09-28

## 2020-09-29 ENCOUNTER — APPOINTMENT (OUTPATIENT)
Dept: ORTHOPEDIC SURGERY | Facility: CLINIC | Age: 60
End: 2020-09-29
Payer: SELF-PAY

## 2020-09-29 PROCEDURE — 20610 DRAIN/INJ JOINT/BURSA W/O US: CPT | Mod: LT

## 2020-09-29 NOTE — PROCEDURE
[de-identified] : Discussed at length with the patient the planned Euflexxa injection. The risks, benefits, convalescence and alternatives were reviewed. The possible side effects discussed included but were not limited to: pain, swelling, heat and redness. There symptoms are generally mild but if they are extensive then contact the office. Giving pain relievers by mouth such as NSAID’s or Tylenol can generally treat the reactions to Euflexxa. Rare cases of infection have been noted. Rash, hives and itching may occur post injection. If you have muscle pain or cramps, flushing and or swelling of the face, rapid heart beat, nausea, dizziness, fever, chills, headache, difficulty breathing, swelling in the arms or legs, or have a prickly feeling of your skin, contact a health care provider immediately.\par \par Following this discussion, the left knee was prepped with alcohol and under sterile condition the Euflexxa injection was performed with a 21 gauge needle. The needle was introduced into the joint, aspiration was performed to ensure intra-articular placement and the medication was injected. Upon withdrawal of the needle the site was cleaned with alcohol and a bandaid applied. The patient tolerated the injection well and there were no adverse effects. Post injection instructions included no strenuous activity for 24 hours, cryotherapy and if there are any adverse effects to contact the office.\par \par The patient received the third Euflexxa injection to the left knee and tolerated well. \par The patient has been instructed to ice and elevate to alleviate/reduce swelling. \par follow up appointment recommended in four to six months.

## 2020-11-11 ENCOUNTER — APPOINTMENT (OUTPATIENT)
Dept: VASCULAR SURGERY | Facility: CLINIC | Age: 60
End: 2020-11-11
Payer: MEDICAID

## 2020-11-11 VITALS — HEART RATE: 76 BPM | SYSTOLIC BLOOD PRESSURE: 165 MMHG | DIASTOLIC BLOOD PRESSURE: 83 MMHG

## 2020-11-11 VITALS
HEART RATE: 80 BPM | HEIGHT: 61 IN | TEMPERATURE: 99.2 F | DIASTOLIC BLOOD PRESSURE: 78 MMHG | WEIGHT: 174 LBS | SYSTOLIC BLOOD PRESSURE: 194 MMHG | BODY MASS INDEX: 32.85 KG/M2

## 2020-11-11 DIAGNOSIS — M79.2 NEURALGIA AND NEURITIS, UNSPECIFIED: ICD-10-CM

## 2020-11-11 PROCEDURE — 99203 OFFICE O/P NEW LOW 30 MIN: CPT

## 2020-11-11 PROCEDURE — 93970 EXTREMITY STUDY: CPT

## 2020-11-11 PROCEDURE — 99072 ADDL SUPL MATRL&STAF TM PHE: CPT

## 2021-01-05 ENCOUNTER — APPOINTMENT (OUTPATIENT)
Dept: ORTHOPEDIC SURGERY | Facility: CLINIC | Age: 61
End: 2021-01-05

## 2021-05-27 ENCOUNTER — APPOINTMENT (OUTPATIENT)
Dept: RADIOLOGY | Facility: CLINIC | Age: 61
End: 2021-05-27
Payer: MEDICAID

## 2021-05-27 ENCOUNTER — OUTPATIENT (OUTPATIENT)
Dept: OUTPATIENT SERVICES | Facility: HOSPITAL | Age: 61
LOS: 1 days | End: 2021-05-27
Payer: MEDICAID

## 2021-05-27 ENCOUNTER — APPOINTMENT (OUTPATIENT)
Dept: ULTRASOUND IMAGING | Facility: CLINIC | Age: 61
End: 2021-05-27
Payer: MEDICAID

## 2021-05-27 DIAGNOSIS — Z00.8 ENCOUNTER FOR OTHER GENERAL EXAMINATION: ICD-10-CM

## 2021-05-27 DIAGNOSIS — Z98.89 OTHER SPECIFIED POSTPROCEDURAL STATES: Chronic | ICD-10-CM

## 2021-05-27 PROCEDURE — 71046 X-RAY EXAM CHEST 2 VIEWS: CPT

## 2021-05-27 PROCEDURE — 76700 US EXAM ABDOM COMPLETE: CPT | Mod: 26

## 2021-05-27 PROCEDURE — 71046 X-RAY EXAM CHEST 2 VIEWS: CPT | Mod: 26

## 2021-05-27 PROCEDURE — 76700 US EXAM ABDOM COMPLETE: CPT

## 2021-06-09 DIAGNOSIS — R06.02 SHORTNESS OF BREATH: ICD-10-CM

## 2021-06-09 DIAGNOSIS — Z78.9 OTHER SPECIFIED HEALTH STATUS: ICD-10-CM

## 2021-06-09 DIAGNOSIS — R07.9 CHEST PAIN, UNSPECIFIED: ICD-10-CM

## 2021-06-09 RX ORDER — TACROLIMUS 1 MG/G
0.1 OINTMENT TOPICAL
Qty: 1 | Refills: 1 | Status: DISCONTINUED | COMMUNITY
Start: 2019-08-27 | End: 2021-06-09

## 2021-06-09 RX ORDER — HYDROCORTISONE 25 MG/G
2.5 OINTMENT TOPICAL
Qty: 1 | Refills: 0 | Status: DISCONTINUED | COMMUNITY
Start: 2019-05-24 | End: 2021-06-09

## 2021-06-09 RX ORDER — DICLOFENAC SODIUM 75 MG/1
75 TABLET, DELAYED RELEASE ORAL
Qty: 1 | Refills: 1 | Status: DISCONTINUED | COMMUNITY
Start: 2020-09-29 | End: 2021-06-09

## 2021-06-09 RX ORDER — HYALURONATE SODIUM 20 MG/2 ML
20 SYRINGE (ML) INTRAARTICULAR
Qty: 1 | Refills: 0 | Status: DISCONTINUED | COMMUNITY
Start: 2020-06-03 | End: 2021-06-09

## 2021-06-09 RX ORDER — NABUMETONE 500 MG/1
500 TABLET, FILM COATED ORAL
Qty: 60 | Refills: 1 | Status: DISCONTINUED | COMMUNITY
Start: 2020-05-20 | End: 2021-06-09

## 2021-06-09 RX ORDER — DICLOFENAC SODIUM 75 MG/1
75 TABLET, DELAYED RELEASE ORAL
Qty: 60 | Refills: 0 | Status: DISCONTINUED | COMMUNITY
Start: 2020-01-29 | End: 2021-06-09

## 2021-06-16 ENCOUNTER — APPOINTMENT (OUTPATIENT)
Dept: CARDIOLOGY | Facility: CLINIC | Age: 61
End: 2021-06-16
Payer: MEDICAID

## 2021-06-16 ENCOUNTER — NON-APPOINTMENT (OUTPATIENT)
Age: 61
End: 2021-06-16

## 2021-06-16 VITALS
HEART RATE: 62 BPM | DIASTOLIC BLOOD PRESSURE: 84 MMHG | SYSTOLIC BLOOD PRESSURE: 138 MMHG | TEMPERATURE: 97.3 F | HEIGHT: 61 IN | WEIGHT: 172 LBS | BODY MASS INDEX: 32.47 KG/M2 | OXYGEN SATURATION: 97 %

## 2021-06-16 PROCEDURE — 99204 OFFICE O/P NEW MOD 45 MIN: CPT

## 2021-06-16 PROCEDURE — 93000 ELECTROCARDIOGRAM COMPLETE: CPT

## 2021-06-21 NOTE — CARDIOLOGY SUMMARY
[de-identified] : Normal sinus rhythm [de-identified] : * Myocardial Perfusion SPECT results are normal.\par * Review of raw data shows: The study is of adequate\par technical quality\par * The left ventricle was small in size. Normal myocardial\par perfusion scan,with no evidence of infarction or inducible\par ischemia.\par * Post-stress gated wall motion analysis was performed\par (LVEF > 70%;LVEDV = 48 ml.), revealing normal LV function. [de-identified] : 1. Mitral annular calcification, otherwise normal mitral\par valve. Minimal mitral regurgitation.\par 2. Normal left ventricular internal dimensions and wall\par thicknesses.\par 3. Normal left ventricular systolic function. No segmental\par wall motion abnormalities.\par 4. Normal right ventricular size and function.\par *** Compared with echocardiogram of 8/20/2010, no\par significant changes noted.\par ------------------------------------------- [de-identified] : CORONARY VESSELS: The coronary circulation is right dominant.\par LM:   --  LM: Normal.\par LAD:   --  LAD: Angiography showed minor luminal irregularities with no\par flow limiting lesions.\par --  D1: Normal.\par CX:   --  Circumflex: Normal.\par RI:   --  Ramus intermedius: Normal.\par RCA:   --  RCA: Normal.\par COMPLICATIONS: There were no complications.\par DIAGNOSTIC IMPRESSIONS: There is mild irregularity of the coronary anatomy.\par DIAGNOSTIC RECOMMENDATIONS: Medical management\par INTERVENTIONAL RECOMMENDATIONS: Medical management\par Prepared and signed by\par Feli Nogueira M.D.\par Signed 10/17/2018 18:42:55\par

## 2021-06-21 NOTE — DISCUSSION/SUMMARY
[FreeTextEntry1] : 1.  Dyspnea on exertion, based on the previous testing I reviewed her angiogram echocardiogram and nuclear stress test I do not think there is any structural heart disease that would be causing her symptoms.  She does not have any symptoms that are suggestive of coronary artery disease and knowing her coronary angiogram was normal last time I do not feel the need there is a need for ischemic testing at this time.  I do suspect that her symptoms are likely related to severe anemia and I recommend that she undergoes testing with a gastroenterologist and primary care physician and we will reevaluate her once above work-up is completed.\par \par

## 2021-06-21 NOTE — HISTORY OF PRESENT ILLNESS
[FreeTextEntry1] : 61-year-old female with history of hypertension hyperlipidemia type 2 diabetes, she had symptoms of nonspecific chest pain and in 2019 she underwent extensive testing with nuclear stress test, echocardiogram that was normal and subsequently had cardiac catheterization that was normal two.\par \par She comes in today for evaluation of symptoms of dyspnea on exertion, at the same time she has been seeing her primary care physician and she was diagnosed with severe microcytic hypochromic anemia, was started on iron tablets and since then she feels that her symptoms have improved.  Additionally she is seeing gastroenterology for her anemia and is scheduled to undergo endoscopy and colonoscopy.  She denies any chest pain palpitations syncope or presyncope.

## 2021-06-21 NOTE — REVIEW OF SYSTEMS
[Feeling Fatigued] : feeling fatigued [SOB] : shortness of breath [Fever] : no fever [Blurry Vision] : no blurred vision [Earache] : no earache [Sore Throat] : no sore throat [Cough] : no cough [Abdominal Pain] : no abdominal pain [Dysuria] : no dysuria [Joint Pain] : no joint pain [Skin Lesions] : no skin lesions [Dizziness] : no dizziness

## 2021-12-06 ENCOUNTER — OUTPATIENT (OUTPATIENT)
Dept: OUTPATIENT SERVICES | Facility: HOSPITAL | Age: 61
LOS: 1 days | End: 2021-12-06

## 2021-12-06 DIAGNOSIS — Z98.89 OTHER SPECIFIED POSTPROCEDURAL STATES: Chronic | ICD-10-CM

## 2021-12-06 DIAGNOSIS — Z00.8 ENCOUNTER FOR OTHER GENERAL EXAMINATION: ICD-10-CM

## 2022-02-07 ENCOUNTER — OUTPATIENT (OUTPATIENT)
Dept: OUTPATIENT SERVICES | Facility: HOSPITAL | Age: 62
LOS: 1 days | End: 2022-02-07
Payer: MEDICAID

## 2022-02-07 ENCOUNTER — APPOINTMENT (OUTPATIENT)
Dept: ULTRASOUND IMAGING | Facility: IMAGING CENTER | Age: 62
End: 2022-02-07
Payer: MEDICAID

## 2022-02-07 ENCOUNTER — APPOINTMENT (OUTPATIENT)
Dept: MAMMOGRAPHY | Facility: IMAGING CENTER | Age: 62
End: 2022-02-07
Payer: MEDICAID

## 2022-02-07 DIAGNOSIS — Z98.89 OTHER SPECIFIED POSTPROCEDURAL STATES: Chronic | ICD-10-CM

## 2022-02-07 DIAGNOSIS — Z00.8 ENCOUNTER FOR OTHER GENERAL EXAMINATION: ICD-10-CM

## 2022-02-07 PROCEDURE — 77067 SCR MAMMO BI INCL CAD: CPT | Mod: 26

## 2022-02-07 PROCEDURE — 77063 BREAST TOMOSYNTHESIS BI: CPT | Mod: 26

## 2022-02-07 PROCEDURE — 77063 BREAST TOMOSYNTHESIS BI: CPT

## 2022-02-07 PROCEDURE — 77067 SCR MAMMO BI INCL CAD: CPT

## 2022-02-17 ENCOUNTER — EMERGENCY (EMERGENCY)
Facility: HOSPITAL | Age: 62
LOS: 1 days | Discharge: HOME | End: 2022-02-17
Attending: STUDENT IN AN ORGANIZED HEALTH CARE EDUCATION/TRAINING PROGRAM | Admitting: STUDENT IN AN ORGANIZED HEALTH CARE EDUCATION/TRAINING PROGRAM
Payer: MEDICAID

## 2022-02-17 VITALS
HEART RATE: 87 BPM | SYSTOLIC BLOOD PRESSURE: 155 MMHG | DIASTOLIC BLOOD PRESSURE: 79 MMHG | OXYGEN SATURATION: 100 % | TEMPERATURE: 98 F

## 2022-02-17 VITALS
HEIGHT: 61 IN | DIASTOLIC BLOOD PRESSURE: 84 MMHG | SYSTOLIC BLOOD PRESSURE: 192 MMHG | HEART RATE: 87 BPM | OXYGEN SATURATION: 99 % | TEMPERATURE: 98 F | RESPIRATION RATE: 18 BRPM

## 2022-02-17 DIAGNOSIS — Z98.89 OTHER SPECIFIED POSTPROCEDURAL STATES: Chronic | ICD-10-CM

## 2022-02-17 LAB
ALBUMIN SERPL ELPH-MCNC: 4.6 G/DL — SIGNIFICANT CHANGE UP (ref 3.3–5)
ALP SERPL-CCNC: 67 U/L — SIGNIFICANT CHANGE UP (ref 40–120)
ALT FLD-CCNC: 26 U/L — SIGNIFICANT CHANGE UP (ref 4–33)
ANION GAP SERPL CALC-SCNC: 16 MMOL/L — HIGH (ref 7–14)
AST SERPL-CCNC: 23 U/L — SIGNIFICANT CHANGE UP (ref 4–32)
BASE EXCESS BLDV CALC-SCNC: -1.3 MMOL/L — SIGNIFICANT CHANGE UP (ref -2–3)
BASOPHILS # BLD AUTO: 0.04 K/UL — SIGNIFICANT CHANGE UP (ref 0–0.2)
BASOPHILS NFR BLD AUTO: 0.4 % — SIGNIFICANT CHANGE UP (ref 0–2)
BILIRUB SERPL-MCNC: <0.2 MG/DL — SIGNIFICANT CHANGE UP (ref 0.2–1.2)
BLOOD GAS VENOUS COMPREHENSIVE RESULT: SIGNIFICANT CHANGE UP
BUN SERPL-MCNC: 18 MG/DL — SIGNIFICANT CHANGE UP (ref 7–23)
CA-I BLD-SCNC: 1.16 MMOL/L — SIGNIFICANT CHANGE UP (ref 1.15–1.29)
CALCIUM SERPL-MCNC: 9.7 MG/DL — SIGNIFICANT CHANGE UP (ref 8.4–10.5)
CHLORIDE BLDV-SCNC: 103 MMOL/L — SIGNIFICANT CHANGE UP (ref 96–108)
CHLORIDE SERPL-SCNC: 103 MMOL/L — SIGNIFICANT CHANGE UP (ref 98–107)
CO2 BLDV-SCNC: 25.6 MMOL/L — SIGNIFICANT CHANGE UP (ref 22–26)
CO2 SERPL-SCNC: 20 MMOL/L — LOW (ref 22–31)
CREAT SERPL-MCNC: 0.54 MG/DL — SIGNIFICANT CHANGE UP (ref 0.5–1.3)
EOSINOPHIL # BLD AUTO: 0.12 K/UL — SIGNIFICANT CHANGE UP (ref 0–0.5)
EOSINOPHIL NFR BLD AUTO: 1.3 % — SIGNIFICANT CHANGE UP (ref 0–6)
GAS PNL BLDV: 135 MMOL/L — LOW (ref 136–145)
GLUCOSE BLDV-MCNC: 236 MG/DL — HIGH (ref 70–99)
GLUCOSE SERPL-MCNC: 236 MG/DL — HIGH (ref 70–99)
HCO3 BLDV-SCNC: 24 MMOL/L — SIGNIFICANT CHANGE UP (ref 22–29)
HCT VFR BLD CALC: 39.1 % — SIGNIFICANT CHANGE UP (ref 34.5–45)
HCT VFR BLDA CALC: 38 % — SIGNIFICANT CHANGE UP (ref 34.5–46.5)
HGB BLD CALC-MCNC: 12.8 G/DL — SIGNIFICANT CHANGE UP (ref 11.5–15.5)
HGB BLD-MCNC: 12.3 G/DL — SIGNIFICANT CHANGE UP (ref 11.5–15.5)
IANC: 6.28 K/UL — SIGNIFICANT CHANGE UP (ref 1.5–8.5)
IMM GRANULOCYTES NFR BLD AUTO: 0.4 % — SIGNIFICANT CHANGE UP (ref 0–1.5)
LACTATE BLDV-MCNC: 2.5 MMOL/L — HIGH (ref 0.5–2)
LYMPHOCYTES # BLD AUTO: 2.1 K/UL — SIGNIFICANT CHANGE UP (ref 1–3.3)
LYMPHOCYTES # BLD AUTO: 23.3 % — SIGNIFICANT CHANGE UP (ref 13–44)
MCHC RBC-ENTMCNC: 27 PG — SIGNIFICANT CHANGE UP (ref 27–34)
MCHC RBC-ENTMCNC: 31.5 GM/DL — LOW (ref 32–36)
MCV RBC AUTO: 85.9 FL — SIGNIFICANT CHANGE UP (ref 80–100)
MONOCYTES # BLD AUTO: 0.42 K/UL — SIGNIFICANT CHANGE UP (ref 0–0.9)
MONOCYTES NFR BLD AUTO: 4.7 % — SIGNIFICANT CHANGE UP (ref 2–14)
NEUTROPHILS # BLD AUTO: 6.28 K/UL — SIGNIFICANT CHANGE UP (ref 1.8–7.4)
NEUTROPHILS NFR BLD AUTO: 69.9 % — SIGNIFICANT CHANGE UP (ref 43–77)
NRBC # BLD: 0 /100 WBCS — SIGNIFICANT CHANGE UP
NRBC # FLD: 0 K/UL — SIGNIFICANT CHANGE UP
PCO2 BLDV: 43 MMHG — HIGH (ref 39–42)
PH BLDV: 7.36 — SIGNIFICANT CHANGE UP (ref 7.32–7.43)
PLATELET # BLD AUTO: 263 K/UL — SIGNIFICANT CHANGE UP (ref 150–400)
PO2 BLDV: 45 MMHG — SIGNIFICANT CHANGE UP
POTASSIUM BLDV-SCNC: 4.5 MMOL/L — SIGNIFICANT CHANGE UP (ref 3.5–5.1)
POTASSIUM SERPL-MCNC: 4.4 MMOL/L — SIGNIFICANT CHANGE UP (ref 3.5–5.3)
POTASSIUM SERPL-SCNC: 4.4 MMOL/L — SIGNIFICANT CHANGE UP (ref 3.5–5.3)
PROT SERPL-MCNC: 7.4 G/DL — SIGNIFICANT CHANGE UP (ref 6–8.3)
RBC # BLD: 4.55 M/UL — SIGNIFICANT CHANGE UP (ref 3.8–5.2)
RBC # FLD: 13.4 % — SIGNIFICANT CHANGE UP (ref 10.3–14.5)
SAO2 % BLDV: 77.9 % — SIGNIFICANT CHANGE UP
SARS-COV-2 RNA SPEC QL NAA+PROBE: SIGNIFICANT CHANGE UP
SODIUM SERPL-SCNC: 139 MMOL/L — SIGNIFICANT CHANGE UP (ref 135–145)
TROPONIN T, HIGH SENSITIVITY RESULT: 6 NG/L — SIGNIFICANT CHANGE UP
TROPONIN T, HIGH SENSITIVITY RESULT: 7 NG/L — SIGNIFICANT CHANGE UP
WBC # BLD: 9 K/UL — SIGNIFICANT CHANGE UP (ref 3.8–10.5)
WBC # FLD AUTO: 9 K/UL — SIGNIFICANT CHANGE UP (ref 3.8–10.5)

## 2022-02-17 PROCEDURE — 99285 EMERGENCY DEPT VISIT HI MDM: CPT | Mod: 25

## 2022-02-17 PROCEDURE — 71046 X-RAY EXAM CHEST 2 VIEWS: CPT | Mod: 26

## 2022-02-17 PROCEDURE — 70496 CT ANGIOGRAPHY HEAD: CPT | Mod: 26,MA

## 2022-02-17 PROCEDURE — 70498 CT ANGIOGRAPHY NECK: CPT | Mod: 26,MA

## 2022-02-17 PROCEDURE — 93010 ELECTROCARDIOGRAM REPORT: CPT

## 2022-02-17 NOTE — ED ADULT NURSE NOTE - OBJECTIVE STATEMENT
was sent by MD for eval of stroke like symptoms that started approx 2 weeks ago  was having teeth chattering and lip twitching  when sleeping  facial numbness, c/o left side neck pain has been having therapy for neck c/o left arm numbness and pain left arm  and legs  Patient received to room 26. Patient is alert and oriented times four. 20 gauge IV lock placed to left upper wrist and labs drawn and sent. Patient being evaluated by MD. Waiting for further orders, results and disposition.   LYN Rizvi

## 2022-02-17 NOTE — ED PROVIDER NOTE - PROGRESS NOTE DETAILS
Hesham Whitfield, DO PGY-2: Paraclinoid carotid aneurysm but otherwise no other findings on CT, will give neurology + neurosurg consultation.

## 2022-02-17 NOTE — ED PROVIDER NOTE - CLINICAL SUMMARY MEDICAL DECISION MAKING FREE TEXT BOX
61F PMH DM HTN CC left sided numbness and left neck pain Given hx and physical looking to r/o dissection carotid, brain bleed,

## 2022-02-17 NOTE — ED ADULT NURSE REASSESSMENT NOTE - NS ED NURSE REASSESS COMMENT FT1
report received from day shift RN Sona. pt is A&Ox4. NAD. pt denies any distress at this time. respirations are even and un labored. safety precautions maintained. pt advised that the CT results need to be given to the MD before she can eat or drink.

## 2022-02-17 NOTE — ED PROVIDER NOTE - OBJECTIVE STATEMENT
61F PMH Hypothyroids, DM CC pain left sided of neck, numbness along left side of face. PT notes symptoms started three weeks ago, went to PCP was prescribed therapy, massage / exercises provided no relief. PT notes left sided facial numbness, constant in nature, went to PCP today was told to come to ED today given findings. PT currently in no acute distress.

## 2022-02-17 NOTE — ED ADULT NURSE NOTE - NSIMPLEMENTINTERV_GEN_ALL_ED
Implemented All Universal Safety Interventions:  Friendly to call system. Call bell, personal items and telephone within reach. Instruct patient to call for assistance. Room bathroom lighting operational. Non-slip footwear when patient is off stretcher. Physically safe environment: no spills, clutter or unnecessary equipment. Stretcher in lowest position, wheels locked, appropriate side rails in place.

## 2022-02-17 NOTE — ED PROVIDER NOTE - NSFOLLOWUPINSTRUCTIONS_ED_ALL_ED_FT
You were seen in the Emergency Department for facial numbness.    Follow up with a neurosurgeon, their information has been provided for you.    Follow up with a neurologist for your facial numbness.    If symptoms persist, follow up with your primary care physician.    You may take 650 mg Tylenol (acetaminophen) every eight hours as needed for pain.    If you have fever, chills, nausea, vomiting, new or worsening pain, or if you have any new symptoms return to the Emergency Department.

## 2022-02-17 NOTE — ED ADULT TRIAGE NOTE - CCCP TRG CHIEF CMPLNT
c/o left side neck pain has been having therapy for neck c/o left arm numbness and pain left arm  and legs/facial numbness

## 2022-02-17 NOTE — ED ADULT TRIAGE NOTE - CHIEF COMPLAINT QUOTE
was sent by MD for eval of stroke like symptoms that started approx 2 weeks ago  was having teeth chattering and lip twitching  when sleeping

## 2022-02-17 NOTE — ED PROVIDER NOTE - ATTENDING CONTRIBUTION TO CARE
62 yo f past medical history hypothyroidism, dm presents for left neck pain and facial numbness for past 3 weeks. She reports no improvement with meds, PT. denies ext numbness or weakness, vision of hearing changes, cp, sob, back pain, abd pain. no hx of trauma. exam as above. ekg without acute ischemic changes. plan: symptom relief prn , labs, cta, reassess.  cta showing aneurysm on side opposite of symptoms and likely incidental. pt informed of findings and need for out-patient follow up. Return precautions were discussed with patient at bedside and patient expressed understanding. stable for dc.

## 2022-02-17 NOTE — ED PROVIDER NOTE - PATIENT PORTAL LINK FT
You can access the FollowMyHealth Patient Portal offered by [facility] by registering at the following website: [email or web address]. By joining [facility]’s FollowMyHealth portal, you will also be able to view your health information using other applications (apps) compatible with our system.

## 2022-02-23 ENCOUNTER — APPOINTMENT (OUTPATIENT)
Dept: NEUROSURGERY | Facility: CLINIC | Age: 62
End: 2022-02-23
Payer: MEDICAID

## 2022-02-23 VITALS
WEIGHT: 172 LBS | HEIGHT: 61 IN | TEMPERATURE: 97.5 F | OXYGEN SATURATION: 98 % | BODY MASS INDEX: 32.47 KG/M2 | HEART RATE: 75 BPM | SYSTOLIC BLOOD PRESSURE: 153 MMHG | DIASTOLIC BLOOD PRESSURE: 74 MMHG

## 2022-02-23 PROCEDURE — 99203 OFFICE O/P NEW LOW 30 MIN: CPT

## 2022-02-23 RX ORDER — ALPRAZOLAM 0.5 MG/1
0.5 TABLET ORAL
Qty: 2 | Refills: 0 | Status: ACTIVE | COMMUNITY
Start: 2022-02-23 | End: 1900-01-01

## 2022-02-23 NOTE — RESULTS/DATA
[FreeTextEntry1] : ACC: [id number] EXAM: CT ANGIO NECK (W)AW IC\par ACC: [id number] EXAM: CT ANGIO BRAIN (W)AW IC\par \par PROCEDURE DATE: 02/17/2022\par \par \par \par INTERPRETATION: CLINICAL HISTORY: Facial numbness and left-sided neck pain.\par \par TECHNIQUE:\par Noncontrast head CT was followed by CT images acquired through the neck and head during the arterial phase.\par Intravenous contrast: 90 cc of Omnipaque-350 mg/ml were administered; 10 cc were discarded.\par Three-dimensional MIP reformats were generated.\par Additional postcontrast CT through the head was obtained.\par \par COMPARISON STUDY: None.\par \par FINDINGS:\par \par NONCONTRAST CT HEAD:\par \par There is no acute intracranial hemorrhage, mass effect, midline shift, extra-axial collection, hydrocephalus, or evidence of acute vascular territorial infarction. Mild patchy hypodensities within the periventricular and subcortical white matter, although nonspecific, likely reflect chronic microvascular disease.\par \par The visualized paranasal sinuses and mastoid air cells are clear. Intraorbital contents are unremarkable. Visualized osseous structures are intact.\par \par CT ANGIOGRAPHY NECK:\par \par Thoracic aorta and branch vessels: Patent. No atherosclerosis. No flow-limiting stenosis. No evidence of dissection.\par \par Right carotid system: Patent. No atherosclerosis. No hemodynamically significant stenosis using NASCET criteria. No evidence of dissection.\par \par Left carotid system: Patent. No atherosclerosis. No hemodynamically significant stenosis using NASCET criteria. No evidence of dissection.\par \par Vertebral arteries: Patent. No atherosclerosis. No flow-limiting stenosis. No evidence of dissection.\par \par Soft tissues of the neck: Few scattered nonspecific small volume lymph nodes.\par \par Visualized spine: Unremarkable.\par \par Visualized upper chest: Unremarkable.\par \par CT ANGIOGRAPHY BRAIN:\par \par Internal carotid arteries: Patent bilaterally. No flow limiting stenosis.\par \par Anterior cerebral arteries: Patent bilaterally without flow limiting stenosis.\par \par Middle cerebral arteries: Patent bilaterally without flow limiting stenosis.\par \par Anterior communicating artery: Visualized.\par \par Posterior communicating arteries: Visualized bilaterally.\par \par Posterior cerebral arteries: Patent bilaterally without stenosis.\par \par Vertebrobasilar: Patent. Hypoplastic vertebrobasilar system, which may be related to prominent bilateral posterior communicating arteries. Evaluation of the mid to distal basilar artery is limited by image noise.\par \par Vascular lesions: 3.3 mm posteromedially oriented outpouching off of the right internal carotid artery with a neck of approximately 2.6 mm, most consistent with a paraclinoid aneurysm.\par \par Dural venous sinuses: Grossly patent.\par \par IMPRESSION:\par \par Noncontrast CT Head: No acute intracranal hemorrhage, mass effect, or evidence of acute vascular territorial infarct. If clinical symptoms persist or worsen, more sensitive evaluation with brain MRI may be obtained, if no contraindications exist.\par \par CTA Neck: No significant flow-limiting stenosis or evidence of acute dissection within the cervical carotid or vertebral arteries.\par \par CTA Head: No proximal large vessel occlusion. 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

## 2022-02-23 NOTE — ASSESSMENT
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 \par \par This finding is questionable and not appreciated on my interpretation. Explained to patient that even if this is a small aneurysm, it is not related to her symptoms or her ER presentation. Provided education on risk of aneurysm rupture and signs of SAH. \par \par Impression: \par MRA brain in 6months then follow up in the office or phone call to review results \par Neurology referral for symptoms \par

## 2022-02-23 NOTE — REVIEW OF SYSTEMS
[As Noted in HPI] : as noted in HPI [Numbness] : numbness [Abnormal Sensation] : an abnormal sensation [Hypersensitivity] : hypersensitivity [Negative] : Heme/Lymph [de-identified] : headaches left side of the body

## 2022-02-23 NOTE — HISTORY OF PRESENT ILLNESS
[de-identified] : Deyanira Louis is a 61yr old patient here for a new patient visit. She has chief complaint of pain and numbness of entire left side of the body starting at her head going to her neck and back all on the left side. SHe went to [facility] ER and had CT head CTA head done. CT head normal CTA incidentally noted a cerebral aneurysm. Here today to discuss cerebral aneurysm. NO family history of cerebral aneurysm. NO social history of smoking.

## 2022-03-08 ENCOUNTER — APPOINTMENT (OUTPATIENT)
Dept: RADIOLOGY | Facility: IMAGING CENTER | Age: 62
End: 2022-03-08

## 2022-03-08 ENCOUNTER — OUTPATIENT (OUTPATIENT)
Dept: OUTPATIENT SERVICES | Facility: HOSPITAL | Age: 62
LOS: 1 days | End: 2022-03-08
Payer: MEDICAID

## 2022-03-08 DIAGNOSIS — Z00.8 ENCOUNTER FOR OTHER GENERAL EXAMINATION: ICD-10-CM

## 2022-03-08 DIAGNOSIS — Z98.89 OTHER SPECIFIED POSTPROCEDURAL STATES: Chronic | ICD-10-CM

## 2022-03-08 PROCEDURE — 77080 DXA BONE DENSITY AXIAL: CPT

## 2022-03-22 ENCOUNTER — APPOINTMENT (OUTPATIENT)
Dept: NEUROLOGY | Facility: CLINIC | Age: 62
End: 2022-03-22

## 2022-03-30 ENCOUNTER — NON-APPOINTMENT (OUTPATIENT)
Age: 62
End: 2022-03-30

## 2022-03-30 ENCOUNTER — APPOINTMENT (OUTPATIENT)
Dept: CARDIOLOGY | Facility: CLINIC | Age: 62
End: 2022-03-30
Payer: MEDICAID

## 2022-03-30 VITALS
DIASTOLIC BLOOD PRESSURE: 74 MMHG | SYSTOLIC BLOOD PRESSURE: 158 MMHG | RESPIRATION RATE: 16 BRPM | TEMPERATURE: 97.3 F | HEIGHT: 61 IN | WEIGHT: 173 LBS | BODY MASS INDEX: 32.66 KG/M2 | HEART RATE: 64 BPM | OXYGEN SATURATION: 98 %

## 2022-03-30 PROCEDURE — XXXXX: CPT

## 2022-03-30 PROCEDURE — 99204 OFFICE O/P NEW MOD 45 MIN: CPT | Mod: 1L,25

## 2022-03-30 PROCEDURE — 93000 ELECTROCARDIOGRAM COMPLETE: CPT | Mod: 1L

## 2022-04-27 ENCOUNTER — OUTPATIENT (OUTPATIENT)
Dept: OUTPATIENT SERVICES | Facility: HOSPITAL | Age: 62
LOS: 1 days | End: 2022-04-27
Payer: MEDICAID

## 2022-04-27 ENCOUNTER — APPOINTMENT (OUTPATIENT)
Dept: MRI IMAGING | Facility: IMAGING CENTER | Age: 62
End: 2022-04-27
Payer: MEDICAID

## 2022-04-27 ENCOUNTER — RESULT REVIEW (OUTPATIENT)
Age: 62
End: 2022-04-27

## 2022-04-27 DIAGNOSIS — Z98.89 OTHER SPECIFIED POSTPROCEDURAL STATES: Chronic | ICD-10-CM

## 2022-04-27 DIAGNOSIS — I67.1 CEREBRAL ANEURYSM, NONRUPTURED: ICD-10-CM

## 2022-04-27 DIAGNOSIS — Z00.8 ENCOUNTER FOR OTHER GENERAL EXAMINATION: ICD-10-CM

## 2022-04-27 PROCEDURE — 70544 MR ANGIOGRAPHY HEAD W/O DYE: CPT | Mod: 26

## 2022-04-27 PROCEDURE — 70544 MR ANGIOGRAPHY HEAD W/O DYE: CPT

## 2022-05-06 ENCOUNTER — OUTPATIENT (OUTPATIENT)
Dept: OUTPATIENT SERVICES | Facility: HOSPITAL | Age: 62
LOS: 1 days | End: 2022-05-06
Payer: MEDICAID

## 2022-05-06 ENCOUNTER — APPOINTMENT (OUTPATIENT)
Dept: MRI IMAGING | Facility: IMAGING CENTER | Age: 62
End: 2022-05-06
Payer: MEDICAID

## 2022-05-06 DIAGNOSIS — Z98.89 OTHER SPECIFIED POSTPROCEDURAL STATES: Chronic | ICD-10-CM

## 2022-05-06 DIAGNOSIS — Z00.8 ENCOUNTER FOR OTHER GENERAL EXAMINATION: ICD-10-CM

## 2022-05-06 DIAGNOSIS — G43.701 CHRONIC MIGRAINE WITHOUT AURA, NOT INTRACTABLE, WITH STATUS MIGRAINOSUS: ICD-10-CM

## 2022-05-06 PROCEDURE — 70551 MRI BRAIN STEM W/O DYE: CPT

## 2022-05-06 PROCEDURE — 70551 MRI BRAIN STEM W/O DYE: CPT | Mod: 26

## 2022-06-07 ENCOUNTER — APPOINTMENT (OUTPATIENT)
Dept: NEUROLOGY | Facility: CLINIC | Age: 62
End: 2022-06-07
Payer: MEDICAID

## 2022-06-07 VITALS
RESPIRATION RATE: 16 BRPM | HEART RATE: 73 BPM | BODY MASS INDEX: 32.66 KG/M2 | DIASTOLIC BLOOD PRESSURE: 74 MMHG | WEIGHT: 173 LBS | HEIGHT: 61 IN | SYSTOLIC BLOOD PRESSURE: 159 MMHG

## 2022-06-07 PROCEDURE — 99205 OFFICE O/P NEW HI 60 MIN: CPT

## 2022-06-07 NOTE — CONSULT LETTER
[Dear  ___] : Dear  [unfilled], [Consult Letter:] : I had the pleasure of evaluating your patient, [unfilled]. [( Thank you for referring [unfilled] for consultation for _____ )] : Thank you for referring [unfilled] for consultation for [unfilled] [Consult Closing:] : Thank you very much for allowing me to participate in the care of this patient.  If you have any questions, please do not hesitate to contact me. [Please see my note below.] : Please see my note below. [Sincerely,] : Sincerely, [FreeTextEntry3] : Humberto Paredes MD\par Chief, Vascular Neurology and Neurology Service , NeuroEndovascular Surgery\par  of Neurology and Radiology\par [facility]\par Director, Comprehensive Stroke Center and Stroke Unit\par [facility]\par Director, NeuroEndovascular Surgery\par [facility]\par

## 2022-06-07 NOTE — PHYSICAL EXAM

## 2022-06-07 NOTE — HISTORY OF PRESENT ILLNESS
[FreeTextEntry1] : Ms. Louis is a 62 year old woman with a PMHx of HTN, DM, hypothyroidism, HLD referred by Dr. Muller for an incidental 3 mm right opthalmic aneurysm. It was found on a CTA in February while being worked up for facial pain and numbness. She was evaluated by Dr. Gonzalez in February who recommended conservative management. She had an MRA HEAD in May which showed stable aneurysm. All neuroimaging reviewed personally by me. The aneurysm is small and looks like anterior cavernous or dural ring in location.

## 2022-06-07 NOTE — DISCUSSION/SUMMARY
[FreeTextEntry1] : Ms. Louis is a 62 year old woman with a PMHx of HTN, DM, hypothyroidism, HLD found to have an incidental 3 mm distal opthalmic aneurysm referred by Dr. Muller. We discussed the risks of having an aneurysm and the likelihood of causing a SAH. Based on the size and location I think the risk of aneurysmal SAH is close to zero. However, they are very concerned about being definitive and would like to proceed with an angiogram for confirmation. The procedure, risks, benefits and alternatives were discussed with the patient and her . All of their questions and concerns were addressed. She would like to proceed.

## 2022-06-07 NOTE — REVIEW OF SYSTEMS
[Fever] : no fever [Chills] : no chills [Feeling Poorly] : not feeling poorly [Feeling Tired] : not feeling tired [Confused or Disoriented] : no confusion [Memory Lapses or Loss] : no memory loss [Decr. Concentrating Ability] : no decrease in concentrating ability [Difficulty with Language] : no ~M difficulty with language [Changed Thought Patterns] : no change in thought patterns [Repeating Questions] : no repeated questioning about recent events [Arm Weakness] : no arm weakness [Hand Weakness] : no hand weakness [Leg Weakness] : no leg weakness [Poor Coordination] : good coordination [Difficulty Writing] : no difficulty writing [Difficulties in Speech] : no speech difficulties [Numbness] : no numbness [Tingling] : no tingling [Seizures] : no convulsions [Dizziness] : no dizziness [Fainting] : no fainting [Lightheadedness] : no lightheadedness [Vertigo] : no vertigo [Tension Headache] : no tension-type headache [Difficulty Walking] : no difficulty walking [Inability to Walk] : able to walk [Anxiety] : no anxiety [Depression] : no depression [Eyesight Problems] : no eyesight problems [Loss Of Hearing] : no hearing loss [Chest Pain] : no chest pain [Palpitations] : no palpitations [Wheezing] : no wheezing [Vomiting] : no vomiting [Incontinence] : no incontinence [Joint Pain] : no joint pain [Skin Wound] : no skin wound [Easy Bruising] : no tendency for easy bruising

## 2022-06-23 ENCOUNTER — RX RENEWAL (OUTPATIENT)
Age: 62
End: 2022-06-23

## 2022-07-01 ENCOUNTER — OUTPATIENT (OUTPATIENT)
Dept: OUTPATIENT SERVICES | Facility: HOSPITAL | Age: 62
LOS: 1 days | End: 2022-07-01
Payer: MEDICAID

## 2022-07-01 VITALS
SYSTOLIC BLOOD PRESSURE: 155 MMHG | TEMPERATURE: 97 F | RESPIRATION RATE: 12 BRPM | OXYGEN SATURATION: 100 % | HEIGHT: 61 IN | WEIGHT: 171.96 LBS | HEART RATE: 70 BPM | DIASTOLIC BLOOD PRESSURE: 70 MMHG

## 2022-07-01 DIAGNOSIS — E78.5 HYPERLIPIDEMIA, UNSPECIFIED: ICD-10-CM

## 2022-07-01 DIAGNOSIS — J45.909 UNSPECIFIED ASTHMA, UNCOMPLICATED: ICD-10-CM

## 2022-07-01 DIAGNOSIS — Z96.651 PRESENCE OF RIGHT ARTIFICIAL KNEE JOINT: Chronic | ICD-10-CM

## 2022-07-01 DIAGNOSIS — Z01.818 ENCOUNTER FOR OTHER PREPROCEDURAL EXAMINATION: ICD-10-CM

## 2022-07-01 DIAGNOSIS — I67.1 CEREBRAL ANEURYSM, NONRUPTURED: ICD-10-CM

## 2022-07-01 DIAGNOSIS — I10 ESSENTIAL (PRIMARY) HYPERTENSION: ICD-10-CM

## 2022-07-01 DIAGNOSIS — E11.9 TYPE 2 DIABETES MELLITUS WITHOUT COMPLICATIONS: ICD-10-CM

## 2022-07-01 DIAGNOSIS — Z98.89 OTHER SPECIFIED POSTPROCEDURAL STATES: Chronic | ICD-10-CM

## 2022-07-01 DIAGNOSIS — Z86.79 PERSONAL HISTORY OF OTHER DISEASES OF THE CIRCULATORY SYSTEM: ICD-10-CM

## 2022-07-01 DIAGNOSIS — Z29.9 ENCOUNTER FOR PROPHYLACTIC MEASURES, UNSPECIFIED: ICD-10-CM

## 2022-07-01 LAB
A1C WITH ESTIMATED AVERAGE GLUCOSE RESULT: 6.5 % — HIGH (ref 4–5.6)
ALBUMIN SERPL ELPH-MCNC: 4.2 G/DL — SIGNIFICANT CHANGE UP (ref 3.3–5.2)
ALP SERPL-CCNC: 70 U/L — SIGNIFICANT CHANGE UP (ref 40–120)
ALT FLD-CCNC: 24 U/L — SIGNIFICANT CHANGE UP
ANION GAP SERPL CALC-SCNC: 13 MMOL/L — SIGNIFICANT CHANGE UP (ref 5–17)
APTT BLD: 32 SEC — SIGNIFICANT CHANGE UP (ref 27.5–35.5)
AST SERPL-CCNC: 22 U/L — SIGNIFICANT CHANGE UP
BASOPHILS # BLD AUTO: 0.03 K/UL — SIGNIFICANT CHANGE UP (ref 0–0.2)
BASOPHILS NFR BLD AUTO: 0.4 % — SIGNIFICANT CHANGE UP (ref 0–2)
BILIRUB SERPL-MCNC: 0.3 MG/DL — LOW (ref 0.4–2)
BLD GP AB SCN SERPL QL: SIGNIFICANT CHANGE UP
BUN SERPL-MCNC: 12 MG/DL — SIGNIFICANT CHANGE UP (ref 8–20)
CALCIUM SERPL-MCNC: 9.6 MG/DL — SIGNIFICANT CHANGE UP (ref 8.6–10.2)
CHLORIDE SERPL-SCNC: 106 MMOL/L — SIGNIFICANT CHANGE UP (ref 98–107)
CO2 SERPL-SCNC: 24 MMOL/L — SIGNIFICANT CHANGE UP (ref 22–29)
CREAT SERPL-MCNC: 0.48 MG/DL — LOW (ref 0.5–1.3)
EGFR: 106 ML/MIN/1.73M2 — SIGNIFICANT CHANGE UP
EOSINOPHIL # BLD AUTO: 0.22 K/UL — SIGNIFICANT CHANGE UP (ref 0–0.5)
EOSINOPHIL NFR BLD AUTO: 2.8 % — SIGNIFICANT CHANGE UP (ref 0–6)
ESTIMATED AVERAGE GLUCOSE: 140 MG/DL — HIGH (ref 68–114)
GLUCOSE SERPL-MCNC: 131 MG/DL — HIGH (ref 70–99)
HCT VFR BLD CALC: 37.9 % — SIGNIFICANT CHANGE UP (ref 34.5–45)
HGB BLD-MCNC: 11.7 G/DL — SIGNIFICANT CHANGE UP (ref 11.5–15.5)
IMM GRANULOCYTES NFR BLD AUTO: 0.1 % — SIGNIFICANT CHANGE UP (ref 0–1.5)
INR BLD: 1.31 RATIO — HIGH (ref 0.88–1.16)
LYMPHOCYTES # BLD AUTO: 2.39 K/UL — SIGNIFICANT CHANGE UP (ref 1–3.3)
LYMPHOCYTES # BLD AUTO: 30.7 % — SIGNIFICANT CHANGE UP (ref 13–44)
MCHC RBC-ENTMCNC: 27.3 PG — SIGNIFICANT CHANGE UP (ref 27–34)
MCHC RBC-ENTMCNC: 30.9 GM/DL — LOW (ref 32–36)
MCV RBC AUTO: 88.3 FL — SIGNIFICANT CHANGE UP (ref 80–100)
MONOCYTES # BLD AUTO: 0.47 K/UL — SIGNIFICANT CHANGE UP (ref 0–0.9)
MONOCYTES NFR BLD AUTO: 6 % — SIGNIFICANT CHANGE UP (ref 2–14)
MRSA PCR RESULT.: SIGNIFICANT CHANGE UP
NEUTROPHILS # BLD AUTO: 4.67 K/UL — SIGNIFICANT CHANGE UP (ref 1.8–7.4)
NEUTROPHILS NFR BLD AUTO: 60 % — SIGNIFICANT CHANGE UP (ref 43–77)
PLATELET # BLD AUTO: 283 K/UL — SIGNIFICANT CHANGE UP (ref 150–400)
POTASSIUM SERPL-MCNC: 4.9 MMOL/L — SIGNIFICANT CHANGE UP (ref 3.5–5.3)
POTASSIUM SERPL-SCNC: 4.9 MMOL/L — SIGNIFICANT CHANGE UP (ref 3.5–5.3)
PROT SERPL-MCNC: 7.3 G/DL — SIGNIFICANT CHANGE UP (ref 6.6–8.7)
PROTHROM AB SERPL-ACNC: 15.2 SEC — HIGH (ref 10.5–13.4)
RBC # BLD: 4.29 M/UL — SIGNIFICANT CHANGE UP (ref 3.8–5.2)
RBC # FLD: 13.5 % — SIGNIFICANT CHANGE UP (ref 10.3–14.5)
S AUREUS DNA NOSE QL NAA+PROBE: SIGNIFICANT CHANGE UP
SODIUM SERPL-SCNC: 143 MMOL/L — SIGNIFICANT CHANGE UP (ref 135–145)
WBC # BLD: 7.79 K/UL — SIGNIFICANT CHANGE UP (ref 3.8–10.5)
WBC # FLD AUTO: 7.79 K/UL — SIGNIFICANT CHANGE UP (ref 3.8–10.5)

## 2022-07-01 PROCEDURE — 93005 ELECTROCARDIOGRAM TRACING: CPT

## 2022-07-01 PROCEDURE — 71046 X-RAY EXAM CHEST 2 VIEWS: CPT | Mod: 26

## 2022-07-01 PROCEDURE — 71046 X-RAY EXAM CHEST 2 VIEWS: CPT

## 2022-07-01 PROCEDURE — 93010 ELECTROCARDIOGRAM REPORT: CPT

## 2022-07-01 PROCEDURE — G0463: CPT

## 2022-07-01 RX ORDER — SODIUM CHLORIDE 9 MG/ML
3 INJECTION INTRAMUSCULAR; INTRAVENOUS; SUBCUTANEOUS ONCE
Refills: 0 | Status: DISCONTINUED | OUTPATIENT
Start: 2022-07-06 | End: 2022-07-20

## 2022-07-01 RX ORDER — ATENOLOL 25 MG/1
1 TABLET ORAL
Qty: 0 | Refills: 0 | DISCHARGE

## 2022-07-01 RX ORDER — LOSARTAN POTASSIUM 100 MG/1
1 TABLET, FILM COATED ORAL
Qty: 0 | Refills: 0 | DISCHARGE

## 2022-07-01 RX ORDER — INSULIN LISPRO 100/ML
6 VIAL (ML) SUBCUTANEOUS
Qty: 0 | Refills: 0 | DISCHARGE

## 2022-07-01 NOTE — H&P PST ADULT - NSICDXPASTSURGICALHX_GEN_ALL_CORE_FT
PAST SURGICAL HISTORY:  H/O  section     Knee Replacement partial knee replacement  on R    S/P total knee replacement, right and left    Tubal Ligation Status 1989

## 2022-07-01 NOTE — H&P PST ADULT - RESPIRATORY
normal/clear to auscultation bilaterally/no wheezes/no rales/no rhonchi/no respiratory distress/no use of accessory muscles/airway patent/breath sounds equal/good air movement/respirations non-labored

## 2022-07-01 NOTE — H&P PST ADULT - NSICDXPASTMEDICALHX_GEN_ALL_CORE_FT
PAST MEDICAL HISTORY:  2019 novel coronavirus disease (COVID-19) 6/2022    Asthma     Chronic Osteoarthritis     COVID-19 vaccine series completed     Diabetes Mellitus x 1 year with neuropathy    History of cerebral aneurysm     HTN (Hypertension)     Hypercholesterolemia     Hypothyroidism     PVD (Peripheral Vascular Disease)

## 2022-07-01 NOTE — H&P PST ADULT - 
ADDITIONAL INFORMATION
Pt. advised to follow up with PCP re. COVID booster additional dose if desired/indicated and pt. agreed.

## 2022-07-01 NOTE — H&P PST ADULT - HISTORY OF PRESENT ILLNESS
61 y/o female presents today to PST pending cerebral angiogram with Dr. Humberto Paredes on 7/6/22 secondary to cerebral aneurysm. Pt. with PMHx of HTN, DM, hypothyroidism, PVD, HLD who as per chart "referred by Dr. Muller for an incidental 3 mm right opthalmic aneurysm. It was found on a CTA in February while being worked up for facial pain and numbness. She was evaluated by Dr. Gonzalez in February who recommended conservative management. She had an MRA HEAD in May 2022 which showed stable aneurysm. All neuroimaging The aneurysm is small and looks like anterior cavernous or dural ring in location. "   61 y/o female presents today to PST pending cerebral angiogram with Dr. Humberto Paredes on 7/6/22 secondary to cerebral aneurysm. Pt. with PMHx of HTN, DM, hypothyroidism, asthma, PVD, HLD who as per chart "referred by Dr. Muller for an incidental 3 mm right opthalmic aneurysm. It was found on a CTA in February while being worked up for facial pain and numbness. She was evaluated by Dr. Gonzalez in February who recommended conservative management. She had an MRA HEAD in May 2022 which showed stable aneurysm. All neuroimaging The aneurysm is small and looks like anterior cavernous or dural ring in location. " Pt. states sometimes strong perfumes trigger a cough, or not feeling well. Denies SOB or wheezing on exam, admits to ongoing occasional nonproductive cough. Denies recent use of inhaler. Pt. tested positive for COVID on 6/14/22, +PCR on chart, denies any ongoing covid 19 symptoms today.

## 2022-07-01 NOTE — H&P PST ADULT - NEUROLOGICAL
negative normal/cranial nerves II-XII intact/sensation intact/responds to pain/responds to verbal commands/cranial nerves intact/no spontaneous movement/superficial reflexes intact

## 2022-07-01 NOTE — H&P PST ADULT - BP NONINVASIVE SYSTOLIC (MM HG)
Pt calling states he is calling to check in with the doctor. Was told to call Monday but wasn't feeling good so is calling today. I asked what he needed me to relay to the doctor and he said nothing that he just needed to check in with . Please call pt.        Ph: [phone]

## 2022-07-01 NOTE — H&P PST ADULT - LAB RESULTS AND INTERPRETATION
labs pending labs pending  7/1/22 18:49 All available labs noted as documented, all abnormal labs noted as documented and have been faxed to PCP. Coags as documented noted and emailed to Junior Crandall of surgeon, repeat coags ordered for LINDA Bauer MS, FNP-BC

## 2022-07-01 NOTE — H&P PST ADULT - ASSESSMENT
63 y/o female presents today to PST pending cerebral angiogram with Dr. Humberto Paredes on 7/6/22 secondary to cerebral aneurysm. Pt. with PMHx of HTN, DM, hypothyroidism, PVD, HLD who as per chart "referred by Dr. Muller for an incidental 3 mm right opthalmic aneurysm. It was found on a CTA in February while being worked up for facial pain and numbness. She was evaluated by Dr. Gonzalez in February who recommended conservative management. She had an MRA HEAD in May 2022 which showed stable aneurysm. All neuroimaging The aneurysm is small and looks like anterior cavernous or dural ring in location. " 63 y/o female presents today to PST pending cerebral angiogram with Dr. Humberto Paredes on 22 secondary to cerebral aneurysm. Pt. with PMHx of HTN, DM, hypothyroidism, asthma, PVD, HLD who as per chart "referred by Dr. Muller for an incidental 3 mm right opthalmic aneurysm. It was found on a CTA in February while being worked up for facial pain and numbness. She was evaluated by Dr. Gonzalez in February who recommended conservative management. She had an MRA HEAD in May 2022 which showed stable aneurysm. All neuroimaging The aneurysm is small and looks like anterior cavernous or dural ring in location. " Pt. states sometimes strong perfumes trigger a cough, or not feeling well. Denies SOB or wheezing on exam, admits to ongoing occasional nonproductive cough. Denies recent use of inhaler. Pt. tested positive for COVID on 22, +PCR on chart, denies any ongoing covid 19 symptoms today.     Pt. educated and instructed regarding all preoperative instructions and education as per policy via both verbal and written means of communication and pt. verbalized agreement and understanding.  Patient educated on surgical scrub, COVID testing-deferred as per policy, preadmission instructions,  and day of procedure medications as per policy and pt. verbalizes understanding and agreement.  Pt instructed to stop vitamins/supplements/herbal medications/NSAIDS for one week prior to surgery as of today and  pt. verbalizes understanding and agreement.  Pt. advised to discuss ASA, metformin and insulin instructions with PCP and pt. verbalizes understanding and agreement.    OPIOID RISK TOOL    CARIN EACH BOX THAT APPLIES AND ADD TOTALS AT THE END    FAMILY HISTORY OF SUBSTANCE ABUSE                 FEMALE         MALE                                                Alcohol                             [  ]1 pt          [  ]3pts                                               Illegal Durgs                     [  ]2 pts        [  ]3pts                                               Rx Drugs                           [  ]4 pts        [  ]4 pts    PERSONAL HISTORY OF SUBSTANCE ABUSE                                                                                          Alcohol                             [  ]3 pts       [  ]3 pts                                               Illegal Drugs                     [  ]4 pts        [  ]4 pts                                               Rx Drugs                           [  ]5 pts        [  ]5 pts    AGE BETWEEN 16-45 YEARS                                      [  ]1 pt         [  ]1 pt    HISTORY OF PREADOLESCENT   SEXUAL ABUSE                                                             [  ]3 pts        [  ]0pts    PSYCHOLOGICAL DISEASE                     ADD, OCD, Bipolar, Schizophrenia        [  ]2 pts         [  ]2 pts                      Depression                                               [  ]1 pt           [  ]1 pt           SCORING TOTAL   (add numbers and type here)              (0)                                     A score of 3 or lower indicated LOW risk for future opioid abuse  A score of 4 to 7 indicated moderate risk for future opioid abuse  A score of 8 or higher indicates a high risk for opioid abuse      CAPRINI SCORE    AGE RELATED RISK FACTORS                                                             [ ] Age 41-60 years                                            (1 Point)  [x] Age: 61-74 years                                           (2 Points)                 [ ] Age= 75 years                                                (3 Points)             DISEASE RELATED RISK FACTORS                                                       [ ] Edema in the lower extremities                 (1 Point)                     [ ] Varicose veins                                               (1 Point)                                 [x ] BMI > 25 Kg/m2                                            (1 Point)                                  [ ] Serious infection (ie PNA)                            (1 Point)                     [ ] Lung disease ( COPD, Emphysema)            (1 Point)                                                                          [ ] Acute myocardial infarction                         (1 Point)                  [ ] Congestive heart failure (in the previous month)  (1 Point)         [ ] Inflammatory bowel disease                            (1 Point)                  [ ] Central venous access, PICC or Port               (2 points)       (within the last month)                                                                [ ] Stroke (in the previous month)                        (5 Points)    [ ] Previous or present malignancy                       (2 points)                                                                                                                                                         HEMATOLOGY RELATED FACTORS                                                         [ ] Prior episodes of VTE                                     (3 Points)                     [ ] Positive family history for VTE                      (3 Points)                  [ ] Prothrombin 07809 A                                     (3 Points)                     [ ] Factor V Leiden                                                (3 Points)                        [ ] Lupus anticoagulants                                      (3 Points)                                                           [ ] Anticardiolipin antibodies                              (3 Points)                                                       [ ] High homocysteine in the blood                   (3 Points)                                             [ ] Other congenital or acquired thrombophilia      (3 Points)                                                [ ] Heparin induced thrombocytopenia                  (3 Points)                                        MOBILITY RELATED FACTORS  [ ] Bed rest                                                         (1 Point)  [ ] Plaster cast                                                    (2 points)  [ ] Bed bound for more than 72 hours           (2 Points)    GENDER SPECIFIC FACTORS  [ ] Pregnancy or had a baby within the last month   (1 Point)  [ ] Post-partum < 6 weeks                                   (1 Point)  [ ] Hormonal therapy  or oral contraception   (1 Point)  [ ] History of pregnancy complications              (1 point)  [ ] Unexplained or recurrent              (1 Point)    OTHER RISK FACTORS                                           (1 Point)  [ ] BMI >40, smoking, diabetes requiring insulin, chemotherapy  blood transfusions and length of surgery over 2 hours    SURGERY RELATED RISK FACTORS  [ ]  Section within the last month     (1 Point)  [ ] Minor surgery                                                  (1 Point)  [ ] Arthroscopic surgery                                       (2 Points)  [x ] Planned major surgery lasting more            (2 Points)      than 45 minutes     [ ] Elective hip or knee joint replacement       (5 points)       surgery                                                TRAUMA RELATED RISK FACTORS  [ ] Fracture of the hip, pelvis, or leg                       (5 Points)  [ ] Spinal cord injury resulting in paralysis             (5 points)       (in the previous month)    [ ] Paralysis  (less than 1 month)                             (5 Points)  [ ] Multiple Trauma within 1 month                        (5 Points)    Total Score [     5   ]    Caprini Score 0-2: Low Risk, NO VTE prophylaxis required for most patients, encourage ambulation  Caprini Score 3-6: Moderate Risk , pharmacologic VTE prophylaxis is indicated for most patients (in the absence of contraindications)  Caprini Score Greater than or =7: High risk, pharmocologic VTE prophylaxis indicated for most patients (in the absence of contraindications)

## 2022-07-01 NOTE — H&P PST ADULT - NSANTHOSAYNRD_GEN_A_CORE
No. DEVANG screening performed.  STOP BANG Legend: 0-2 = LOW Risk; 3-4 = INTERMEDIATE Risk; 5-8 = HIGH Risk

## 2022-07-01 NOTE — H&P PST ADULT - CARDIOVASCULAR
normal/regular rate and rhythm/S1 S2 present/no gallops/no rub/no murmur/normal PMI/no pedal edema details… normal/regular rate and rhythm/S1 S2 present/no gallops/no rub/no murmur/no JVD/normal PMI/no pedal edema

## 2022-07-01 NOTE — H&P PST ADULT - MUSCULOSKELETAL
normal/ROM intact/no joint swelling/no joint erythema/no joint warmth/no calf tenderness/normal gait/strength 5/5 bilateral upper extremities/strength 5/5 bilateral lower extremities details…

## 2022-07-06 ENCOUNTER — TRANSCRIPTION ENCOUNTER (OUTPATIENT)
Age: 62
End: 2022-07-06

## 2022-07-06 ENCOUNTER — APPOINTMENT (OUTPATIENT)
Dept: NEUROLOGY | Facility: HOSPITAL | Age: 62
End: 2022-07-06

## 2022-07-06 ENCOUNTER — OUTPATIENT (OUTPATIENT)
Dept: OUTPATIENT SERVICES | Facility: HOSPITAL | Age: 62
LOS: 1 days | End: 2022-07-06
Payer: MEDICAID

## 2022-07-06 VITALS
HEART RATE: 70 BPM | SYSTOLIC BLOOD PRESSURE: 153 MMHG | RESPIRATION RATE: 16 BRPM | DIASTOLIC BLOOD PRESSURE: 68 MMHG | TEMPERATURE: 98 F | OXYGEN SATURATION: 99 %

## 2022-07-06 VITALS
OXYGEN SATURATION: 98 % | DIASTOLIC BLOOD PRESSURE: 61 MMHG | HEART RATE: 62 BPM | RESPIRATION RATE: 16 BRPM | SYSTOLIC BLOOD PRESSURE: 133 MMHG

## 2022-07-06 DIAGNOSIS — Z98.89 OTHER SPECIFIED POSTPROCEDURAL STATES: Chronic | ICD-10-CM

## 2022-07-06 DIAGNOSIS — I67.1 CEREBRAL ANEURYSM, NONRUPTURED: ICD-10-CM

## 2022-07-06 DIAGNOSIS — Z96.651 PRESENCE OF RIGHT ARTIFICIAL KNEE JOINT: Chronic | ICD-10-CM

## 2022-07-06 LAB
APTT BLD: 32 SEC — SIGNIFICANT CHANGE UP (ref 27.5–35.5)
INR BLD: 1.29 RATIO — HIGH (ref 0.88–1.16)
PROTHROM AB SERPL-ACNC: 15 SEC — HIGH (ref 10.5–13.4)

## 2022-07-06 PROCEDURE — 76377 3D RENDER W/INTRP POSTPROCES: CPT | Mod: 26

## 2022-07-06 PROCEDURE — 36226 PLACE CATH VERTEBRAL ART: CPT | Mod: 50

## 2022-07-06 PROCEDURE — 99213 OFFICE O/P EST LOW 20 MIN: CPT

## 2022-07-06 PROCEDURE — 76377 3D RENDER W/INTRP POSTPROCES: CPT

## 2022-07-06 PROCEDURE — 85610 PROTHROMBIN TIME: CPT

## 2022-07-06 PROCEDURE — C1894: CPT

## 2022-07-06 PROCEDURE — 36224 PLACE CATH CAROTD ART: CPT

## 2022-07-06 PROCEDURE — 36415 COLL VENOUS BLD VENIPUNCTURE: CPT

## 2022-07-06 PROCEDURE — 36226 PLACE CATH VERTEBRAL ART: CPT

## 2022-07-06 PROCEDURE — C1887: CPT

## 2022-07-06 PROCEDURE — 36227 PLACE CATH XTRNL CAROTID: CPT

## 2022-07-06 PROCEDURE — 36227 PLACE CATH XTRNL CAROTID: CPT | Mod: 50

## 2022-07-06 PROCEDURE — 85730 THROMBOPLASTIN TIME PARTIAL: CPT

## 2022-07-06 PROCEDURE — C1769: CPT

## 2022-07-06 PROCEDURE — 36224 PLACE CATH CAROTD ART: CPT | Mod: 50

## 2022-07-06 RX ORDER — ASPIRIN/CALCIUM CARB/MAGNESIUM 324 MG
1 TABLET ORAL
Qty: 0 | Refills: 0 | DISCHARGE

## 2022-07-06 RX ORDER — INSULIN GLARGINE 100 [IU]/ML
30 INJECTION, SOLUTION SUBCUTANEOUS
Qty: 0 | Refills: 0 | DISCHARGE

## 2022-07-06 RX ORDER — AMLODIPINE BESYLATE 2.5 MG/1
1 TABLET ORAL
Qty: 0 | Refills: 0 | DISCHARGE

## 2022-07-06 RX ORDER — ATORVASTATIN CALCIUM 80 MG/1
1 TABLET, FILM COATED ORAL
Qty: 0 | Refills: 0 | DISCHARGE

## 2022-07-06 RX ORDER — ALBUTEROL 90 UG/1
2 AEROSOL, METERED ORAL
Qty: 0 | Refills: 0 | DISCHARGE

## 2022-07-06 RX ORDER — INSULIN LISPRO 100/ML
10 VIAL (ML) SUBCUTANEOUS
Qty: 0 | Refills: 0 | DISCHARGE

## 2022-07-06 RX ORDER — OMEPRAZOLE 10 MG/1
1 CAPSULE, DELAYED RELEASE ORAL
Qty: 0 | Refills: 0 | DISCHARGE

## 2022-07-06 RX ORDER — LOSARTAN POTASSIUM 100 MG/1
1 TABLET, FILM COATED ORAL
Qty: 0 | Refills: 0 | DISCHARGE

## 2022-07-06 RX ORDER — ATENOLOL 25 MG/1
1 TABLET ORAL
Qty: 0 | Refills: 0 | DISCHARGE

## 2022-07-06 RX ORDER — METFORMIN HYDROCHLORIDE 850 MG/1
1 TABLET ORAL
Qty: 0 | Refills: 0 | DISCHARGE

## 2022-07-06 NOTE — ASU DISCHARGE PLAN (ADULT/PEDIATRIC) - CARE PROVIDER_API CALL
Humberto Paredes)  Neurology; Vascular Neurology  [address] [place] [address]  Phone: [phone]  Fax: [phone]  Follow Up Time:

## 2022-07-06 NOTE — ASU DISCHARGE PLAN (ADULT/PEDIATRIC) - NS MD DC FALL RISK RISK
For information on Fall & Injury Prevention, visit: [email or web address] OR  [email or web address] OR  https://www.cdc.gov/steadi/patient.html

## 2022-07-06 NOTE — ASU DISCHARGE PLAN (ADULT/PEDIATRIC) - ASU DC SPECIAL INSTRUCTIONSFT
Follow up with Dr Paredes in 1-2 weeks to review results and treatment plan. Follow up with Dr Paredes in 1-2 weeks to review results and treatment plan.  Hold Metformin until 7/9/22

## 2022-07-06 NOTE — CHART NOTE - NSCHARTNOTEFT_GEN_A_CORE
Neurointerventional Surgery Post Procedure Note    Procedure: Selective Cerebral Angiography     Pre- Procedure Diagnosis: Cerebral Aneurysm seen on CTA  Post- Procedure Diagnosis: s/p diagnostic cerebral angiogram confirming cerebral aneurysm at right dural ring     : Dr. Humberto Paredes MD  Physician Assistant:   Nurse: RN  Anesthesiologist:                                            Radiology Tech:    Sheath:  4 [place] 10 cm sheath    I/Os: estimated blood loss less than 10cc,  IV fluids    cc,   Urine out put 0 cc,  Contrast: Omnipaque 240 112 cc    Vitals: /53  HR 61 Spo2 100 %    Preliminary Report:  Under a 4 [place] a0 cm short sheath via the right groin under MAC sedation via left vertebral artery, left internal carotid artery, left external carotid artery, right vertebral artery, right internal carotid artery, right external carotid artery, a selective cerebral angiography was performed and reveals cerebral aneurysm at right dural ring.     Patient tolerated procedure well.  Patient remains hemodynamically stable, no change in neurological status compared to baseline.  Results were discussed with patient, patient's family and Neurosurgery.  Right groin sheath  was discontinued.   Hemostasis was obtained with approximately 15 minutes of manual compression.     No active bleeding, no hematoma, no ecchymosis.   Angio-seal device was applied, quick clot and safeguard balloon dressing applied at   Patient transferred to recovery room in stable condition. Neurointerventional Surgery Post Procedure Note    Procedure: Selective Cerebral Angiography     Pre- Procedure Diagnosis: Cerebral Aneurysm seen on CTA  Post- Procedure Diagnosis: s/p diagnostic cerebral angiogram confirming cerebral aneurysm at right dural ring approx 5 mm X 4 mm with 2.5 mm neck     : Dr. Humberto Paredes MD  Physician Assistant: April Mora PA-C  Nurse: LYN Garcia, LYN Thakur  Anesthesiologist:                                            Radiology Tech:    Sheath: 4 [place] 10 cm sheath    I/Os: estimated blood loss less than 10cc, IV fluids 200 cc, Urine out put 0 cc,  Contrast: Omnipaque 240 112 cc    Vitals: /53  HR 61 Spo2 100 %    Preliminary Report:  Under a 4 [place] 10 cm short sheath via the right groin under MAC sedation via left vertebral artery, left internal carotid artery, left external carotid artery, right vertebral artery, right internal carotid artery, right external carotid artery, a selective cerebral angiography was performed and reveals cerebral aneurysm approx 5 mm X 4 mm with 2.5 mm neck at right dural ring.     Patient tolerated procedure well.  Patient remains hemodynamically stable, no change in neurological status compared to baseline.  Results were discussed with patient, patient's family and Neurosurgery.  Right groin sheath  was discontinued.   Hemostasis was obtained with approximately 15 minutes of manual compression.     No active bleeding, no hematoma, no ecchymosis. Quick clot and safeguard balloon dressing applied at 11:25.   Patient transferred to recovery room in stable condition. Neurointerventional Surgery Post Procedure Note    Procedure: Selective Cerebral Angiography     Pre- Procedure Diagnosis: Cerebral Aneurysm seen on CTA  Post- Procedure Diagnosis: s/p diagnostic cerebral angiogram confirming cerebral aneurysm at right dural ring approx 5 mm X 4 mm with 2.5 mm neck     : Dr. Humberto Paredes MD. Dr. Rivera Villeda MD  Physician Assistant: April Mora PA-C  Nurse: LYN Garcia, LYN Thakur  Anesthesiologist:                                            Radiology Tech:    Sheath: 4 [place] 10 cm sheath    I/Os: estimated blood loss less than 10cc, IV fluids 200 cc, Urine out put 0 cc,  Contrast: Omnipaque 240 112 cc    Vitals: /53  HR 61 Spo2 100 %    Preliminary Report:  Under a 4 [place] 10 cm short sheath via the right groin under MAC sedation via left vertebral artery, left internal carotid artery, left external carotid artery, right vertebral artery, right internal carotid artery, right external carotid artery, a selective cerebral angiography was performed and reveals cerebral aneurysm approx 5 mm X 4 mm with 2.5 mm neck at right dural ring.     Patient tolerated procedure well.  Patient remains hemodynamically stable, no change in neurological status compared to baseline.  Results were discussed with patient, patient's family and Neurosurgery.  Right groin sheath  was discontinued.   Hemostasis was obtained with approximately 15 minutes of manual compression.     No active bleeding, no hematoma, no ecchymosis. Quick clot and safeguard balloon dressing applied at 11:25.   Patient transferred to recovery room in stable condition.

## 2022-07-06 NOTE — CHART NOTE - NSCHARTNOTEFT_GEN_A_CORE
Neurointerventional Surgery  Pre-Procedure Note     This is a 62y ____ hand dominant Female    HPI:      Allergies: aloe vera (Swelling)  baking soda (Hives)  Celebrex (Swelling)  Mobic (Swelling)  sulfa drugs (Unknown)      PMH/PSH:  PAST MEDICAL & SURGICAL HISTORY:  PVD (Peripheral Vascular Disease)      HTN (Hypertension)      Hypercholesterolemia      Diabetes Mellitus  x 1 year with neuropathy      Hypothyroidism      Chronic Osteoarthritis      Asthma       novel coronavirus disease (COVID-19)  2022      COVID-19 vaccine series completed      Cerebral aneurysm, nonruptured      Knee Replacement  partial knee replacement  on R      Tubal Ligation Status        H/O  section        S/P total knee replacement, right  and left          Social History:   Social History:    FAMILY HISTORY:  Family history of myocardial infarction at age less than 60  mother  at 45 from MI    Family history of CVA  father  at 57 y/o    Family history of myocardial infarction at age less than 60 (Sibling)  brother  at 56 y/o from MI        Current Medications:   sodium chloride 0.9% lock flush 3 milliLiter(s) IV Push Once      Physical Exam:  Constitutional: NAD    Neuro  * Mental Status:  GCS 15:  E(4), V(5), M(6).  Awake, alert, oriented to conversation.  * Cranial Nerves: Cnii-Cnxii grossly intact. PERRL, EOMI, tongue midline, no gaze deviation  * Motor: RUE 5/5, LUE 5/5, RLE 5/5, LLE 5/5  * Sensory: Sensation intact to light touch  * Reflexes: Not assessed  * Gait: Not assessed    Cardiovascular:  S1, S2 no murmurs appreciated.  Regular rate and rhythm.  Eyes: See neurologic examination with detailed examination of eyes.  ENT: No JVD, Trachea Midline.  Respiratory: Clear to auscultation.  Gastrointestinal: Soft, nontender, nondistended.  Genitourinary: [ ] Pruett, [ x ] No Pruett.   Musculoskeletal: No muscle wasting noted, (See neuorlogic assessment for full muscle strength assessment) No pretibial edema appreciated, no appreciable calf tenderness.  Skin:  Wound inspected, no redness, bleeding or drainage noted.    Hematologic / Lymph / Immunologic: No bleeding from IV sites or wounds, No lymphadenopathy, No Hives or allergic type skin lesions      NIH SS:  DATE:  TIME:  1A: Level of consciousness (0-3): 0  1B: Questions (0-2): 0    1C: Commands (0-2): 0  2: Gaze (0-2): 0  3: Visual fields (0-3): 0  4: Facial palsy (0-3): 0  MOTOR:  5A: Left arm motor drift (0-4): 0  5B: Right arm motor drift (0-4): 0  6A: Left leg motor drift (0-4): 0  6B: Right leg motor drift (0-4): 0  7: Limb ataxia (0-2): 0  SENSORY:  8: Sensation (0-2): 0  SPEECH:  9: Language (0-3): 0  10: Dysarthria (0-2): 0  EXTINCTION:  11: Extinction/inattention (0-2): 0    TOTAL SCORE:     Labs:                   PT/INR - ( 2022 08:59 )   PT: 15.0 sec;   INR: 1.29 ratio         PTT - ( 2022 08:59 )  PTT:32.0 sec    Blood Bank:         Assessment/Plan:   This is a 62y  year old right / left hand dominant Female  presents with   Patient presents to neuro-IR for selective cerebral angiography.     Procedure, goals, risks, benefits and alternatives  were discussed with patient and (patient's family).  All questions were answered.  Risks include but are not limited to stroke, vessel injury, hemorrhage, and or groin hematoma.  Patient demonstrates understanding  of all risks involved with this procedure and wishes to continue.   Appropriate  content was obtained from patient and consent is in the patient's chart. Neurointerventional Surgery  Pre-Procedure Note       HPI: 61 y/o female presents today to PST pending cerebral angiogram with Dr. Humberto Paredes on 22 secondary to cerebral aneurysm. Pt with PMHx of HTN, DM, hypothyroidism, asthma, PVD, HLD who as per chart "referred by Dr. Muller for an incidental 3 mm right opthalmic aneurysm. It was found on a CTA in February while being worked up for facial pain and numbness. She was evaluated by Dr. Gonzalez in February who recommended conservative management. She had an MRA HEAD in May 2022 which showed stable aneurysm. All neuroimaging The aneurysm is small and looks like anterior cavernous or dural ring in location. " Pt states sometimes strong perfumes trigger a cough, or not feeling well. Denies SOB or wheezing on exam, admits to ongoing occasional nonproductive cough. Denies recent use of inhaler. Pt. tested positive for COVID on 22, +PCR on chart, denies any ongoing covid 19 symptoms today.      Allergies: aloe vera (Swelling)  baking soda (Hives)  Celebrex (Swelling)  Mobic (Swelling)  sulfa drugs (Unknown)      PMH/PSH:  PAST MEDICAL & SURGICAL HISTORY:  PVD (Peripheral Vascular Disease)  HTN (Hypertension)  Hypercholesterolemia  Diabetes Mellitus  x 1 year with neuropathy  Hypothyroidism  Chronic Osteoarthritis  Asthma  2019 novel coronavirus disease (COVID-19)  2022  COVID-19 vaccine series completed  Cerebral aneurysm, nonruptured  Knee Replacement  partial knee replacement  on R  Tubal Ligation Status    H/O  section    S/P total knee replacement, right  and left      FAMILY HISTORY:  Family history of myocardial infarction at age less than 60  mother  at 45 from MI    Family history of CVA  father  at 57 y/o    Family history of myocardial infarction at age less than 60 (Sibling)  brother  at 54 y/o from MI    Physical Exam:  Constitutional: NAD    Neuro  * Mental Status:  GCS 15:  E(4), V(5), M(6).  Awake, alert, oriented to conversation.  * Cranial Nerves: Cnii-Cnxii grossly intact. PERRL, EOMI, tongue midline, no gaze deviation  * Motor: RUE 5/5, LUE 5/5, RLE 5/5, LLE 5/5  * Sensory: Sensation intact to light touch  * Reflexes: Not assessed  * Gait: Not assessed    Cardiovascular:  S1, S2 no murmurs appreciated.  Regular rate and rhythm.  Eyes: See neurologic examination with detailed examination of eyes.  ENT: No JVD, Trachea Midline.  Respiratory: Clear to auscultation.  Gastrointestinal: Soft, nontender, nondistended.  Genitourinary: [ ] Pruett, [ x ] No Pruett.   Musculoskeletal: No muscle wasting noted, (See neuorlogic assessment for full muscle strength assessment) No pretibial edema appreciated, no appreciable calf tenderness.  Skin:  Wound inspected, no redness, bleeding or drainage noted.    Hematologic / Lymph / Immunologic: No bleeding from IV sites or wounds, No lymphadenopathy, No Hives or allergic type skin lesions      NIH SS: 0  DATE: 22  TIME: 9:40  1A: Level of consciousness (0-3): 0  1B: Questions (0-2): 0    1C: Commands (0-2): 0  2: Gaze (0-2): 0  3: Visual fields (0-3): 0  4: Facial palsy (0-3): 0  MOTOR:  5A: Left arm motor drift (0-4): 0  5B: Right arm motor drift (0-4): 0  6A: Left leg motor drift (0-4): 0  6B: Right leg motor drift (0-4): 0  7: Limb ataxia (0-2): 0  SENSORY:  8: Sensation (0-2): 0  SPEECH:  9: Language (0-3): 0  10: Dysarthria (0-2): 0  EXTINCTION:  11: Extinction/inattention (0-2): 0    TOTAL SCORE: 0      PT/INR - ( 2022 08:59 )   PT: 15.0 sec;   INR: 1.29 ratio         PTT - ( 2022 08:59 )  PTT:32.0 sec    Assessment/Plan:   This is a 62y, right handed female presents to neuro-IR for selective cerebral angiography.     Procedure, goals, risks, benefits and alternatives  were discussed with patient and patient's daughter. All questions were answered.  Risks include but are not limited to stroke, vessel injury, hemorrhage, and or groin hematoma.  Patient demonstrates understanding of all risks involved with this procedure and wishes to continue.   Appropriate  content was obtained from patient and consent is in the patient's chart. Neurointerventional Surgery  Pre-Procedure Note       HPI: 61 y/o female presents today to PST pending cerebral angiogram with Dr. Humberto Paredes on 22 secondary to cerebral aneurysm. Pt with PMHx of HTN, DM, hypothyroidism, asthma, PVD, HLD who as per chart "referred by Dr. Muller for an incidental 3 mm right opthalmic aneurysm. It was found on a CTA in February while being worked up for facial pain and numbness. She was evaluated by Dr. Gonzalez in February who recommended conservative management. She had an MRA HEAD in May 2022 which showed stable aneurysm. All neuroimaging The aneurysm is small and looks like anterior cavernous or dural ring in location. " Pt states sometimes strong perfumes trigger a cough, or not feeling well. Denies SOB or wheezing on exam, admits to ongoing occasional nonproductive cough. Denies recent use of inhaler. Pt. tested positive for COVID on 22, +PCR on chart, denies any ongoing covid 19 symptoms today.      Allergies: aloe vera (Swelling)  baking soda (Hives)  Celebrex (Swelling)  Mobic (Swelling)  sulfa drugs (Unknown)      PMH/PSH:  PAST MEDICAL & SURGICAL HISTORY:  PVD (Peripheral Vascular Disease)  HTN (Hypertension)  Hypercholesterolemia  Diabetes Mellitus  x 1 year with neuropathy  Hypothyroidism  Chronic Osteoarthritis  Asthma  2019 novel coronavirus disease (COVID-19)  2022  COVID-19 vaccine series completed  Cerebral aneurysm, nonruptured  Knee Replacement  partial knee replacement  on R  Tubal Ligation Status    H/O  section    S/P total knee replacement, right  and left      FAMILY HISTORY:  Family history of myocardial infarction at age less than 60  mother  at 45 from MI    Family history of CVA  father  at 59 y/o    Family history of myocardial infarction at age less than 60 (Sibling)  brother  at 54 y/o from MI    Physical Exam:  Constitutional: NAD    Neuro  * Mental Status:  GCS 15:  E(4), V(5), M(6).  Awake, alert, oriented to conversation.  * Cranial Nerves: Cnii-Cnxii grossly intact. PERRL, EOMI, tongue midline, no gaze deviation  * Motor: RUE 5/5, LUE 5/5, RLE 5/5, LLE 5/5  * Sensory: Sensation intact to light touch  * Reflexes: Not assessed  * Gait: Not assessed    Cardiovascular:  S1, S2 no murmurs appreciated.  Regular rate and rhythm.  Eyes: See neurologic examination with detailed examination of eyes.  ENT: No JVD, Trachea Midline.  Respiratory: Clear to auscultation.  Gastrointestinal: Soft, nontender, nondistended.  Genitourinary: [ ] Pruett, [ x ] No Pruett.   Musculoskeletal: No muscle wasting noted, (See neurologic assessment for full muscle strength assessment) No pretibial edema appreciated, no appreciable calf tenderness.  Skin:  Wound inspected, no redness, bleeding or drainage noted.    Hematologic / Lymph / Immunologic: No bleeding from IV sites or wounds, No lymphadenopathy, No Hives or allergic type skin lesions      NIH SS: 0  DATE: 22  TIME: 9:40  1A: Level of consciousness (0-3): 0  1B: Questions (0-2): 0    1C: Commands (0-2): 0  2: Gaze (0-2): 0  3: Visual fields (0-3): 0  4: Facial palsy (0-3): 0  MOTOR:  5A: Left arm motor drift (0-4): 0  5B: Right arm motor drift (0-4): 0  6A: Left leg motor drift (0-4): 0  6B: Right leg motor drift (0-4): 0  7: Limb ataxia (0-2): 0  SENSORY:  8: Sensation (0-2): 0  SPEECH:  9: Language (0-3): 0  10: Dysarthria (0-2): 0  EXTINCTION:  11: Extinction/inattention (0-2): 0    TOTAL SCORE: 0      PT/INR - ( 2022 08:59 )   PT: 15.0 sec;   INR: 1.29 ratio         PTT - ( 2022 08:59 )  PTT:32.0 sec    Assessment/Plan:   This is a 61 y/o female presents to neuro-IR for selective cerebral angiography.     Procedure, goals, risks, benefits and alternatives  were discussed with patient and patient's daughter. All questions were answered.  Risks include but are not limited to stroke, vessel injury, hemorrhage, and or groin hematoma.  Patient demonstrates understanding of all risks involved with this procedure and wishes to continue. Appropriate consent was obtained from patient and consent is in the patient's chart. Neurointerventional Surgery  Pre-Procedure Note       HPI: 63 y/o female presents today to PST pending cerebral angiogram with Dr. Humberto Paredes on 22 secondary to cerebral aneurysm. Pt with PMHx of HTN, DM, hypothyroidism, asthma, PVD, HLD who as per chart "referred by Dr. Muller for an incidental 3 mm right opthalmic aneurysm. It was found on a CTA in February while being worked up for facial pain and numbness. She was evaluated by Dr. Gonzalez in February who recommended conservative management. She had an MRA HEAD in May 2022 which showed stable aneurysm. All neuroimaging The aneurysm is small and looks like anterior cavernous or dural ring in location. " Pt states sometimes strong perfumes trigger a cough, or not feeling well. Denies SOB or wheezing on exam, admits to ongoing occasional nonproductive cough. Denies recent use of inhaler. Pt. tested positive for COVID on 22, +PCR on chart, denies any ongoing covid 19 symptoms today.      Allergies: aloe vera (Swelling)  baking soda (Hives)  Celebrex (Swelling)  Mobic (Swelling)  sulfa drugs (Unknown)      PMH/PSH:  PAST MEDICAL & SURGICAL HISTORY:  PVD (Peripheral Vascular Disease)  HTN (Hypertension)  Hypercholesterolemia  Diabetes Mellitus  x 1 year with neuropathy  Hypothyroidism  Chronic Osteoarthritis  Asthma  2019 novel coronavirus disease (COVID-19)  2022  COVID-19 vaccine series completed  Cerebral aneurysm, nonruptured  Knee Replacement  partial knee replacement  on R  Tubal Ligation Status    H/O  section    S/P total knee replacement, right  and left      FAMILY HISTORY:  Family history of myocardial infarction at age less than 60  mother  at 45 from MI    Family history of CVA  father  at 57 y/o    Family history of myocardial infarction at age less than 60 (Sibling)  brother  at 54 y/o from MI    Physical Exam:  Constitutional: NAD    Neuro  * Mental Status:  GCS 15:  E(4), V(5), M(6).  Awake, alert, oriented to conversation.  * Cranial Nerves: Cnii-Cnxii grossly intact. PERRL, EOMI, tongue midline, no gaze deviation  * Motor: RUE 5/5, LUE 5/5, RLE 5/5, LLE 5/5  * Sensory: Sensation intact to light touch  * Reflexes: Not assessed  * Gait: Not assessed    Cardiovascular:  S1, S2 no murmurs appreciated.  Regular rate and rhythm.  Eyes: See neurologic examination with detailed examination of eyes.  ENT: No JVD, Trachea Midline.  Respiratory: Clear to auscultation.  Gastrointestinal: Soft, nontender, nondistended.  Genitourinary: [ ] Pruett, [ x ] No Pruett.   Musculoskeletal: No muscle wasting noted, (See neurologic assessment for full muscle strength assessment) No pretibial edema appreciated, no appreciable calf tenderness.  Skin:  Wound inspected, no redness, bleeding or drainage noted.    Hematologic / Lymph / Immunologic: No bleeding from IV sites or wounds, No lymphadenopathy, No Hives or allergic type skin lesions      NIH SS: 0  DATE: 22  TIME: 9:40  1A: Level of consciousness (0-3): 0  1B: Questions (0-2): 0    1C: Commands (0-2): 0  2: Gaze (0-2): 0  3: Visual fields (0-3): 0  4: Facial palsy (0-3): 0  MOTOR:  5A: Left arm motor drift (0-4): 0  5B: Right arm motor drift (0-4): 0  6A: Left leg motor drift (0-4): 0  6B: Right leg motor drift (0-4): 0  7: Limb ataxia (0-2): 0  SENSORY:  8: Sensation (0-2): 0  SPEECH:  9: Language (0-3): 0  10: Dysarthria (0-2): 0  EXTINCTION:  11: Extinction/inattention (0-2): 0    TOTAL SCORE: 0      PT/INR - ( 2022 08:59 )   PT: 15.0 sec;   INR: 1.29 ratio         PTT - ( 2022 08:59 )  PTT:32.0 sec    Assessment/Plan:   This is a 63 y/o female presents to neuro-IR for selective cerebral angiography.     Procedure, goals, risks, benefits and alternatives  were discussed with patient and patient's daughter. All questions were answered.  Risks include but are not limited to stroke, vessel injury, hemorrhage, and or groin hematoma.  Patient demonstrates understanding of all risks involved with this procedure and wishes to continue. Appropriate consent was obtained from patient and consent is in the patient's chart.  Patient was seen and examined by Dr Paredes and  me. History and exam as documented above by PA/NP was confirmed by me.  Agree with plan as outlined above.

## 2022-08-17 ENCOUNTER — APPOINTMENT (OUTPATIENT)
Dept: NEUROSURGERY | Facility: CLINIC | Age: 62
End: 2022-08-17

## 2022-08-24 NOTE — H&P ADULT - PROBLEM SELECTOR PLAN 3
Continue with synthyroid.   Check TSH. Pt is a 31yF with history of T2DM and cHTN s/p c-sx.  Please see delivery note for details.  During postpartum course patient's vitals were stable, vaginal bleeding appropriate, and pain well controlled.  Endocrine was following, finger sticks were moderately well controlled on Lispro 5 with meals and moderate sliding scale. On day of discharge patient was ambulating, pt had adequate oral intake, and was voiding freely.  Discharge instructions and precautions were given.  Will return to clinic in 1-2 weeks for incision check. Patient to follow up postpartum with Dr. Amaya (endocrinology) and will be discharged on Metformin 500 BID.  Pt is a 31yF with history of T2DM and cHTN s/p c-sx.  Please see delivery note for details.  During postpartum course patient's vitals were stable, vaginal bleeding appropriate, and pain well controlled.  Endocrine was following, finger sticks were moderately well controlled on Lispro 5 with meals and moderate sliding scale. On day of discharge patient was ambulating, pt had adequate oral intake, and was voiding freely.  Discharge instructions and precautions were given.  Will return to clinic in 1-2 weeks for incision check. Patient to follow up postpartum with Dr. Amaya (endocrinology) and will be discharged on Metformin 500 BID for three days and the Metformin 1000 BID afterwards.

## 2022-08-26 PROBLEM — J45.909 UNSPECIFIED ASTHMA, UNCOMPLICATED: Chronic | Status: ACTIVE | Noted: 2022-07-01

## 2022-08-26 PROBLEM — Z92.29 PERSONAL HISTORY OF OTHER DRUG THERAPY: Chronic | Status: ACTIVE | Noted: 2022-07-01

## 2022-08-26 PROBLEM — U07.1 COVID-19: Chronic | Status: ACTIVE | Noted: 2022-07-01

## 2022-08-26 PROBLEM — I67.1 CEREBRAL ANEURYSM, NONRUPTURED: Chronic | Status: ACTIVE | Noted: 2022-07-01

## 2022-08-29 RX ORDER — DULAGLUTIDE 0.75 MG/.5ML
0.75 INJECTION, SOLUTION SUBCUTANEOUS
Qty: 2 | Refills: 0 | Status: ACTIVE | COMMUNITY
Start: 2022-06-02

## 2022-08-29 RX ORDER — INSULIN GLARGINE 100 [IU]/ML
100 INJECTION, SOLUTION SUBCUTANEOUS AT BEDTIME
Refills: 0 | Status: ACTIVE | COMMUNITY

## 2022-08-29 RX ORDER — OMEPRAZOLE 20 MG/1
20 TABLET, DELAYED RELEASE ORAL DAILY
Refills: 0 | Status: ACTIVE | COMMUNITY

## 2022-08-29 RX ORDER — ASPIRIN 81 MG/1
81 TABLET, COATED ORAL
Qty: 30 | Refills: 0 | Status: ACTIVE | COMMUNITY
Start: 2022-06-03

## 2022-08-29 RX ORDER — FERROUS SULFATE 325(65) MG
325 (65 FE) TABLET ORAL 3 TIMES DAILY
Refills: 0 | Status: ACTIVE | COMMUNITY

## 2022-08-29 RX ORDER — INSULIN LISPRO 100 [IU]/ML
100 INJECTION, SOLUTION INTRAVENOUS; SUBCUTANEOUS TWICE DAILY
Refills: 0 | Status: ACTIVE | COMMUNITY

## 2022-08-29 NOTE — HISTORY OF PRESENT ILLNESS
[FreeTextEntry1] : 62-year-old female with history of hypertension hyperlipidemia type 2 diabetes, she had symptoms of nonspecific chest pain and in 2019 she underwent extensive testing with nuclear stress test, echocardiogram that was normal and subsequently had cardiac catheterization that was normal too

## 2022-08-29 NOTE — REVIEW OF SYSTEMS
[Fever] : no fever [Feeling Fatigued] : feeling fatigued [Blurry Vision] : no blurred vision [Earache] : no earache [Sore Throat] : no sore throat [SOB] : shortness of breath [Cough] : no cough [Abdominal Pain] : no abdominal pain [Dysuria] : no dysuria [Joint Pain] : no joint pain [Skin Lesions] : no skin lesions [Dizziness] : no dizziness

## 2022-08-29 NOTE — DISCUSSION/SUMMARY
[FreeTextEntry1] : 1.  Dyspnea on exertion, we had extensive testing in the past and there was no evidence of any significant cardiac disease I think her dyspnea on exertion is likely related to combination of poorly controlled hypertension, weight, deconditioning.  I recommend that we manage her above factors aggressively, her blood pressure is elevated but I am not clear on what medications she is taking as she did not bring a list of her medications.  We will ask primary care physician to review this.\par \par  [EKG obtained to assist in diagnosis and management of assessed problem(s)] : EKG obtained to assist in diagnosis and management of assessed problem(s)

## 2022-08-29 NOTE — CARDIOLOGY SUMMARY
[de-identified] : Normal sinus rhythm [de-identified] : * Myocardial Perfusion SPECT results are normal.\par * Review of raw data shows: The study is of adequate\par technical quality\par * The left ventricle was small in size. Normal myocardial\par perfusion scan,with no evidence of infarction or inducible\par ischemia.\par * Post-stress gated wall motion analysis was performed\par (LVEF > 70%;LVEDV = 48 ml.), revealing normal LV function. [de-identified] : 1. Mitral annular calcification, otherwise normal mitral\par valve. Minimal mitral regurgitation.\par 2. Normal left ventricular internal dimensions and wall\par thicknesses.\par 3. Normal left ventricular systolic function. No segmental\par wall motion abnormalities.\par 4. Normal right ventricular size and function.\par *** Compared with echocardiogram of 8/20/2010, no\par significant changes noted.\par ------------------------------------------- [de-identified] : CORONARY VESSELS: The coronary circulation is right dominant.\par LM:   --  LM: Normal.\par LAD:   --  LAD: Angiography showed minor luminal irregularities with no\par flow limiting lesions.\par --  D1: Normal.\par CX:   --  Circumflex: Normal.\par RI:   --  Ramus intermedius: Normal.\par RCA:   --  RCA: Normal.\par COMPLICATIONS: There were no complications.\par DIAGNOSTIC IMPRESSIONS: There is mild irregularity of the coronary anatomy.\par DIAGNOSTIC RECOMMENDATIONS: Medical management\par INTERVENTIONAL RECOMMENDATIONS: Medical management\par Prepared and signed by\par Feli Nogueira M.D.\par Signed 10/17/2018 18:42:55\par

## 2022-09-06 NOTE — DATA REVIEWED
[de-identified] : MRI brain non con 5/6/22 [de-identified] : MRA brain non con 4/27/22 [de-identified] : Diagnostic cerebral angiogram with Dr. Paredes on 7/6/22

## 2022-09-06 NOTE — HISTORY OF PRESENT ILLNESS
[FreeTextEntry1] : GARCIA BARROSO is a 62 year old female with PMH of HTN, HLD, DM, hypothyroidism with incidental 3mm right ophthalmic aneurysm. She initially presented to [facility] ER with chief complaint of facial pain and numbness that radiated to entire left side of the body. CT head normal CTA incidentally noted a 3mm right ophthalmic aneurysm. Denies known family history of cerebral aneurysm. NO social history of smoking. MRA brain 4/27/22 showed stable aneurysm, 4.5 x 5.1 cm medially oriented aneurysm extending from the RIGHT paraclinoid distal cavernous internal carotid artery. The neck measures 1.9 mm. She saw Dr. Paredes on 6/7 for consultation and underwent diagnostic cerebral angiogram with Dr. Paredes on 7/6/22 which demonstrates small 4.6 mm x 3.8 mm x 3.4 mm right ICA dural ring aneurysm with a wide 2.4 mm neck projecting medially from the right internal carotid artery.

## 2022-09-06 NOTE — ASSESSMENT
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 s/p dx angio with Dr. Paredes 7/6/22 which shows small 4.6 mm x 3.8 mm x 3.4 mm right ICA dural ring aneurysm with a wide 2.4 mm neck projecting medially from the right internal carotid artery.\par \par [[This finding is questionable and not appreciated on my interpretation. Explained to patient that even if this is a small aneurysm, it is not related to her symptoms or her ER presentation. Provided education on risk of aneurysm rupture and signs of SAH. \par \par \par PLAN:\par MRA brain in 6months then follow up in the office or phone call to review results \par Neurology referral for symptoms ]]\par

## 2022-09-07 ENCOUNTER — APPOINTMENT (OUTPATIENT)
Dept: NEUROSURGERY | Facility: CLINIC | Age: 62
End: 2022-09-07

## 2022-09-29 ENCOUNTER — APPOINTMENT (OUTPATIENT)
Dept: NEUROLOGY | Facility: CLINIC | Age: 62
End: 2022-09-29

## 2022-09-29 VITALS
HEART RATE: 66 BPM | WEIGHT: 170 LBS | BODY MASS INDEX: 32.1 KG/M2 | DIASTOLIC BLOOD PRESSURE: 75 MMHG | SYSTOLIC BLOOD PRESSURE: 150 MMHG | HEIGHT: 61 IN

## 2022-09-29 PROCEDURE — 99215 OFFICE O/P EST HI 40 MIN: CPT

## 2022-09-29 RX ORDER — AMLODIPINE BESYLATE 5 MG/1
5 TABLET ORAL DAILY
Qty: 30 | Refills: 2 | Status: DISCONTINUED | COMMUNITY
Start: 2022-03-30 | End: 2022-09-29

## 2022-09-29 NOTE — DISCUSSION/SUMMARY
[FreeTextEntry1] : Ms. Louis is a 62 year old woman with a PMHx of HTN, DM, hypothyroidism, HLD found to have an incidental 3 mm distal opthalmic aneurysm referred by Dr. Muller. She is 2.5 months s/p cerebral angiogram which showed 4.6 mm x 3.8 mm x 3.4 mm right ICA dural ring aneurysm. We discussed there is no role for treatment at this time as the aneurysm is small, but will continue to monitor the aneurysm conservatively with yearly MRAs and if the aneurysm grows we would discuss treatment. I will see her again in 1 year.  All of their questions and concerns were addressed. \par

## 2022-09-29 NOTE — HISTORY OF PRESENT ILLNESS
[FreeTextEntry1] : Ms. Louis is a 62 year old woman with a PMHx of HTN, DM, hypothyroidism, HLD referred by Dr. Muller for an incidental 3 mm right opthalmic aneurysm. It was found on a CTA in February while being worked up for facial pain and numbness. She was evaluated by Dr. Gonzalez in February who recommended conservative management. She underwent a cerebral angiogram on 07/06/22 which showed Small incidental 4.6 mm x 3.8 mm x 3.4 mm right ICA dural ring aneurysm with a wide 2.4 mm neck projecting medially from the right internal carotid artery. She comes in today for a follow up visit. She denies any stroke/TIA symptoms.

## 2022-09-29 NOTE — PHYSICAL EXAM

## 2022-09-29 NOTE — REVIEW OF SYSTEMS
[Fever] : no fever [Chills] : no chills [Feeling Poorly] : not feeling poorly [Feeling Tired] : not feeling tired [Confused or Disoriented] : no confusion [Memory Lapses or Loss] : no memory loss [Decr. Concentrating Ability] : no decrease in concentrating ability [Difficulty with Language] : no ~M difficulty with language [Changed Thought Patterns] : no change in thought patterns [Repeating Questions] : no repeated questioning about recent events [Facial Weakness] : no facial weakness [Arm Weakness] : no arm weakness [Hand Weakness] : no hand weakness [Leg Weakness] : no leg weakness [Poor Coordination] : good coordination [Difficulty Writing] : no difficulty writing [Difficulties in Speech] : no speech difficulties [Numbness] : no numbness [Tingling] : no tingling [Seizures] : no convulsions [Dizziness] : no dizziness [Fainting] : no fainting [Lightheadedness] : no lightheadedness [Vertigo] : no vertigo [Tension Headache] : no tension-type headache [Difficulty Walking] : no difficulty walking [Suicidal] : not suicidal [Anxiety] : no anxiety [Depression] : no depression [Eyesight Problems] : no eyesight problems [Loss Of Hearing] : no hearing loss [Shortness Of Breath] : no shortness of breath [Wheezing] : no wheezing [Vomiting] : no vomiting [Incontinence] : no incontinence [Joint Pain] : no joint pain [Skin Lesions] : no skin lesions [Easy Bleeding] : no tendency for easy bleeding [Easy Bruising] : no tendency for easy bruising

## 2022-12-07 ENCOUNTER — NON-APPOINTMENT (OUTPATIENT)
Age: 62
End: 2022-12-07

## 2022-12-07 ENCOUNTER — APPOINTMENT (OUTPATIENT)
Dept: CARDIOLOGY | Facility: CLINIC | Age: 62
End: 2022-12-07

## 2022-12-07 VITALS
BODY MASS INDEX: 33.04 KG/M2 | WEIGHT: 175 LBS | DIASTOLIC BLOOD PRESSURE: 73 MMHG | SYSTOLIC BLOOD PRESSURE: 146 MMHG | HEART RATE: 66 BPM | OXYGEN SATURATION: 97 % | HEIGHT: 61 IN

## 2022-12-07 PROCEDURE — 93000 ELECTROCARDIOGRAM COMPLETE: CPT

## 2022-12-07 PROCEDURE — 99214 OFFICE O/P EST MOD 30 MIN: CPT | Mod: 25

## 2022-12-07 RX ORDER — AMLODIPINE BESYLATE 10 MG/1
10 TABLET ORAL
Refills: 0 | Status: DISCONTINUED | COMMUNITY
End: 2022-12-07

## 2022-12-07 NOTE — CARDIOLOGY SUMMARY
[de-identified] : Normal sinus rhythm [de-identified] : * Myocardial Perfusion SPECT results are normal.\par * Review of raw data shows: The study is of adequate\par technical quality\par * The left ventricle was small in size. Normal myocardial\par perfusion scan,with no evidence of infarction or inducible\par ischemia.\par * Post-stress gated wall motion analysis was performed\par (LVEF > 70%;LVEDV = 48 ml.), revealing normal LV function. [de-identified] : 1. Mitral annular calcification, otherwise normal mitral\par valve. Minimal mitral regurgitation.\par 2. Normal left ventricular internal dimensions and wall\par thicknesses.\par 3. Normal left ventricular systolic function. No segmental\par wall motion abnormalities.\par 4. Normal right ventricular size and function.\par *** Compared with echocardiogram of 8/20/2010, no\par significant changes noted.\par ------------------------------------------- [de-identified] : CORONARY VESSELS: The coronary circulation is right dominant.\par LM:   --  LM: Normal.\par LAD:   --  LAD: Angiography showed minor luminal irregularities with no\par flow limiting lesions.\par --  D1: Normal.\par CX:   --  Circumflex: Normal.\par RI:   --  Ramus intermedius: Normal.\par RCA:   --  RCA: Normal.\par COMPLICATIONS: There were no complications.\par DIAGNOSTIC IMPRESSIONS: There is mild irregularity of the coronary anatomy.\par DIAGNOSTIC RECOMMENDATIONS: Medical management\par INTERVENTIONAL RECOMMENDATIONS: Medical management\par Prepared and signed by\par Feli Nogueira M.D.\par Signed 10/17/2018 18:42:55\par

## 2022-12-07 NOTE — HISTORY OF PRESENT ILLNESS
[FreeTextEntry1] : 62-year-old female with history of hypertension hyperlipidemia type 2 diabetes, she had symptoms of nonspecific chest pain and in 2019 she underwent extensive testing with nuclear stress test, echocardiogram that was normal and subsequently had cardiac catheterization that was normal too \par currently reports swelling in her legs that she thinks happened since she has been on amlodipine.

## 2022-12-07 NOTE — DISCUSSION/SUMMARY
[FreeTextEntry1] : HTN: BP is elevated. patient is not tolerating amlodipine.  will stop this, add HCTZ 12.5mg po daily. fu 4 weeks. \par \par Edema: as above. \par \par  [EKG obtained to assist in diagnosis and management of assessed problem(s)] : EKG obtained to assist in diagnosis and management of assessed problem(s)

## 2022-12-09 RX ORDER — PEN NEEDLE, DIABETIC 29 G X1/2"
31G X 8 MM NEEDLE, DISPOSABLE MISCELLANEOUS
Qty: 100 | Refills: 0 | Status: ACTIVE | COMMUNITY
Start: 2022-08-22

## 2022-12-09 RX ORDER — COVID-19 ANTIGEN TEST
KIT MISCELLANEOUS
Qty: 2 | Refills: 0 | Status: ACTIVE | COMMUNITY
Start: 2022-08-02

## 2022-12-09 RX ORDER — ALBUTEROL SULFATE 90 UG/1
108 (90 BASE) INHALANT RESPIRATORY (INHALATION)
Qty: 7 | Refills: 0 | Status: ACTIVE | COMMUNITY
Start: 2022-01-01

## 2022-12-09 RX ORDER — NAPROXEN 500 MG/1
500 TABLET ORAL
Qty: 30 | Refills: 0 | Status: ACTIVE | COMMUNITY
Start: 2022-09-14

## 2022-12-09 RX ORDER — OMEPRAZOLE 20 MG/1
20 CAPSULE, DELAYED RELEASE ORAL
Qty: 30 | Refills: 0 | Status: ACTIVE | COMMUNITY
Start: 2022-06-21

## 2022-12-09 RX ORDER — CLOTRIMAZOLE AND BETAMETHASONE DIPROPIONATE 10; .5 MG/G; MG/G
1-0.05 CREAM TOPICAL
Qty: 45 | Refills: 0 | Status: ACTIVE | COMMUNITY
Start: 2022-09-19

## 2022-12-09 RX ORDER — ALCOHOL ANTISEPTIC PADS
70 PADS, MEDICATED (EA) TOPICAL
Qty: 100 | Refills: 0 | Status: ACTIVE | COMMUNITY
Start: 2022-06-21

## 2022-12-09 RX ORDER — LIDOCAINE 5% 700 MG/1
5 PATCH TOPICAL
Qty: 60 | Refills: 0 | Status: ACTIVE | COMMUNITY
Start: 2022-09-29

## 2022-12-09 RX ORDER — PROMETHAZINE HYDROCHLORIDE 6.25 MG/5ML
6.25 SOLUTION ORAL
Qty: 210 | Refills: 0 | Status: ACTIVE | COMMUNITY
Start: 2022-06-16

## 2022-12-09 RX ORDER — SILVER SULFADIAZINE 10 MG/G
1 CREAM TOPICAL
Qty: 20 | Refills: 0 | Status: ACTIVE | COMMUNITY
Start: 2022-08-01

## 2022-12-09 RX ORDER — INSULIN LISPRO 200 [IU]/ML
200 INJECTION, SOLUTION SUBCUTANEOUS
Qty: 6 | Refills: 0 | Status: ACTIVE | COMMUNITY
Start: 2021-07-05

## 2022-12-09 RX ORDER — AMOXICILLIN AND CLAVULANATE POTASSIUM 875; 125 MG/1; MG/1
875-125 TABLET, COATED ORAL
Qty: 14 | Refills: 0 | Status: ACTIVE | COMMUNITY
Start: 2022-11-04

## 2022-12-09 RX ORDER — FLUTICASONE PROPIONATE 50 UG/1
50 SPRAY, METERED NASAL
Qty: 10 | Refills: 0 | Status: ACTIVE | COMMUNITY
Start: 2022-11-04

## 2022-12-09 RX ORDER — BLOOD SUGAR DIAGNOSTIC
STRIP MISCELLANEOUS
Qty: 50 | Refills: 0 | Status: ACTIVE | COMMUNITY
Start: 2022-02-17

## 2022-12-09 RX ORDER — LANCETS 28 GAUGE
EACH MISCELLANEOUS
Qty: 100 | Refills: 0 | Status: ACTIVE | COMMUNITY
Start: 2022-01-14

## 2023-01-04 ENCOUNTER — NON-APPOINTMENT (OUTPATIENT)
Age: 63
End: 2023-01-04

## 2023-01-04 ENCOUNTER — APPOINTMENT (OUTPATIENT)
Dept: CARDIOLOGY | Facility: CLINIC | Age: 63
End: 2023-01-04
Payer: MEDICAID

## 2023-01-04 VITALS
HEART RATE: 71 BPM | WEIGHT: 175 LBS | DIASTOLIC BLOOD PRESSURE: 76 MMHG | OXYGEN SATURATION: 96 % | SYSTOLIC BLOOD PRESSURE: 173 MMHG | TEMPERATURE: 97.3 F | HEIGHT: 61 IN | BODY MASS INDEX: 33.04 KG/M2

## 2023-01-04 PROCEDURE — 93000 ELECTROCARDIOGRAM COMPLETE: CPT

## 2023-01-04 PROCEDURE — 99214 OFFICE O/P EST MOD 30 MIN: CPT | Mod: 25

## 2023-01-04 RX ORDER — HYDROCHLOROTHIAZIDE 12.5 MG/1
12.5 CAPSULE ORAL
Qty: 30 | Refills: 2 | Status: DISCONTINUED | COMMUNITY
Start: 2022-12-07 | End: 2023-01-04

## 2023-02-07 NOTE — DISCUSSION/SUMMARY
[EKG obtained to assist in diagnosis and management of assessed problem(s)] : EKG obtained to assist in diagnosis and management of assessed problem(s) [FreeTextEntry1] : HTN: BP is elevated.  Increase hydrochlorothiazide to 25 mg once daily and reassess the blood pressure in the next 4 weeks.\par \par Edema: Improved since stopping calcium channel blockers.  No further testing at this time.\par \par

## 2023-02-07 NOTE — CARDIOLOGY SUMMARY
[de-identified] : Normal sinus rhythm [de-identified] : * Myocardial Perfusion SPECT results are normal.\par * Review of raw data shows: The study is of adequate\par technical quality\par * The left ventricle was small in size. Normal myocardial\par perfusion scan,with no evidence of infarction or inducible\par ischemia.\par * Post-stress gated wall motion analysis was performed\par (LVEF > 70%;LVEDV = 48 ml.), revealing normal LV function. [de-identified] : 1. Mitral annular calcification, otherwise normal mitral\par valve. Minimal mitral regurgitation.\par 2. Normal left ventricular internal dimensions and wall\par thicknesses.\par 3. Normal left ventricular systolic function. No segmental\par wall motion abnormalities.\par 4. Normal right ventricular size and function.\par *** Compared with echocardiogram of 8/20/2010, no\par significant changes noted.\par ------------------------------------------- [de-identified] : CORONARY VESSELS: The coronary circulation is right dominant.\par LM:   --  LM: Normal.\par LAD:   --  LAD: Angiography showed minor luminal irregularities with no\par flow limiting lesions.\par --  D1: Normal.\par CX:   --  Circumflex: Normal.\par RI:   --  Ramus intermedius: Normal.\par RCA:   --  RCA: Normal.\par COMPLICATIONS: There were no complications.\par DIAGNOSTIC IMPRESSIONS: There is mild irregularity of the coronary anatomy.\par DIAGNOSTIC RECOMMENDATIONS: Medical management\par INTERVENTIONAL RECOMMENDATIONS: Medical management\par Prepared and signed by\par Feli Nogueira M.D.\par Signed 10/17/2018 18:42:55\par

## 2023-02-07 NOTE — HISTORY OF PRESENT ILLNESS
[FreeTextEntry1] : 62-year-old female with history of hypertension hyperlipidemia type 2 diabetes, she had symptoms of nonspecific chest pain and in 2019 she underwent extensive testing with nuclear stress test, echocardiogram that was normal and subsequently had cardiac catheterization that was normal too lower extremity edema has improved markedly since she stopped taking amlodipine.

## 2023-04-13 NOTE — ED ADULT NURSE NOTE - SUICIDE SCREENING DEPRESSION
Negative Patient admitted from group home due to sore throat and dry cough.  Patient is ambulatory w/ RW at the group home.

## 2023-06-05 NOTE — PROGRESS NOTE ADULT - PROBLEM SELECTOR PLAN 1
Nursing Note:  Derrick SALAS Emmanuel presents today for Port labs.    Patient seen by provider today: No   present during visit today: Not Applicable.    Note: N/A.    Intravenous Access:  Implanted Port.    Discharge Plan:   Patient was sent to home in stable condition.    Brii Sheets RN           Scheduled for coronary angiogram tonight

## 2023-07-19 ENCOUNTER — APPOINTMENT (OUTPATIENT)
Dept: CARDIOLOGY | Facility: CLINIC | Age: 63
End: 2023-07-19
Payer: MEDICAID

## 2023-07-19 ENCOUNTER — NON-APPOINTMENT (OUTPATIENT)
Age: 63
End: 2023-07-19

## 2023-07-19 VITALS
WEIGHT: 173 LBS | HEIGHT: 61 IN | HEART RATE: 71 BPM | OXYGEN SATURATION: 99 % | BODY MASS INDEX: 32.66 KG/M2 | DIASTOLIC BLOOD PRESSURE: 75 MMHG | SYSTOLIC BLOOD PRESSURE: 128 MMHG

## 2023-07-19 PROCEDURE — 99214 OFFICE O/P EST MOD 30 MIN: CPT | Mod: 25

## 2023-07-19 PROCEDURE — 93000 ELECTROCARDIOGRAM COMPLETE: CPT

## 2023-07-19 RX ORDER — LOSARTAN POTASSIUM 100 MG/1
100 TABLET, FILM COATED ORAL
Qty: 90 | Refills: 3 | Status: ACTIVE | COMMUNITY
Start: 1900-01-01 | End: 1900-01-01

## 2023-07-19 RX ORDER — HYDROCHLOROTHIAZIDE 25 MG/1
25 TABLET ORAL DAILY
Qty: 90 | Refills: 2 | Status: ACTIVE | COMMUNITY
Start: 2023-01-04 | End: 1900-01-01

## 2023-08-09 NOTE — DISCUSSION/SUMMARY
[FreeTextEntry1] : HTN: BP is better controlled I recommend we continue with current medication that includes hydrochlorothiazide 25 mg daily..  Edema: Improved since stopping calcium channel blockers.  No further testing at this time.   [EKG obtained to assist in diagnosis and management of assessed problem(s)] : EKG obtained to assist in diagnosis and management of assessed problem(s)

## 2023-08-09 NOTE — HISTORY OF PRESENT ILLNESS
[FreeTextEntry1] : 63-year-old female with history of hypertension hyperlipidemia type 2 diabetes, she had symptoms of nonspecific chest pain and in 2019 she underwent extensive testing with nuclear stress test, echocardiogram that was normal  On today's visit patient feels well denies any shortness of breath palpitations syncope or presyncope.

## 2023-08-09 NOTE — CARDIOLOGY SUMMARY
[de-identified] : Normal sinus rhythm [de-identified] : * Myocardial Perfusion SPECT results are normal.\par  * Review of raw data shows: The study is of adequate\par  technical quality\par  * The left ventricle was small in size. Normal myocardial\par  perfusion scan,with no evidence of infarction or inducible\par  ischemia.\par  * Post-stress gated wall motion analysis was performed\par  (LVEF > 70%;LVEDV = 48 ml.), revealing normal LV function. [de-identified] : 1. Mitral annular calcification, otherwise normal mitral\par  valve. Minimal mitral regurgitation.\par  2. Normal left ventricular internal dimensions and wall\par  thicknesses.\par  3. Normal left ventricular systolic function. No segmental\par  wall motion abnormalities.\par  4. Normal right ventricular size and function.\par  *** Compared with echocardiogram of 8/20/2010, no\par  significant changes noted.\par  ------------------------------------------- [de-identified] : CORONARY VESSELS: The coronary circulation is right dominant.\par  LM:   --  LM: Normal.\par  LAD:   --  LAD: Angiography showed minor luminal irregularities with no\par  flow limiting lesions.\par  --  D1: Normal.\par  CX:   --  Circumflex: Normal.\par  RI:   --  Ramus intermedius: Normal.\par  RCA:   --  RCA: Normal.\par  COMPLICATIONS: There were no complications.\par  DIAGNOSTIC IMPRESSIONS: There is mild irregularity of the coronary anatomy.\par  DIAGNOSTIC RECOMMENDATIONS: Medical management\par  INTERVENTIONAL RECOMMENDATIONS: Medical management\par  Prepared and signed by\par  Feli Nogueira M.D.\par  Signed 10/17/2018 18:42:55\par

## 2023-09-13 ENCOUNTER — OUTPATIENT (OUTPATIENT)
Dept: OUTPATIENT SERVICES | Facility: HOSPITAL | Age: 63
LOS: 1 days | End: 2023-09-13
Payer: MEDICARE

## 2023-09-13 ENCOUNTER — APPOINTMENT (OUTPATIENT)
Dept: MRI IMAGING | Facility: IMAGING CENTER | Age: 63
End: 2023-09-13
Payer: MEDICARE

## 2023-09-13 DIAGNOSIS — Z96.651 PRESENCE OF RIGHT ARTIFICIAL KNEE JOINT: Chronic | ICD-10-CM

## 2023-09-13 DIAGNOSIS — I67.1 CEREBRAL ANEURYSM, NONRUPTURED: ICD-10-CM

## 2023-09-13 DIAGNOSIS — Z98.89 OTHER SPECIFIED POSTPROCEDURAL STATES: Chronic | ICD-10-CM

## 2023-09-13 PROCEDURE — 70544 MR ANGIOGRAPHY HEAD W/O DYE: CPT | Mod: 26,MH

## 2023-09-13 PROCEDURE — 70544 MR ANGIOGRAPHY HEAD W/O DYE: CPT

## 2023-09-15 ENCOUNTER — RESULT REVIEW (OUTPATIENT)
Age: 63
End: 2023-09-15

## 2023-09-15 DIAGNOSIS — I67.1 CEREBRAL ANEURYSM, NONRUPTURED: ICD-10-CM

## 2023-11-29 ENCOUNTER — APPOINTMENT (OUTPATIENT)
Dept: RADIOLOGY | Facility: IMAGING CENTER | Age: 63
End: 2023-11-29
Payer: MEDICARE

## 2023-11-29 ENCOUNTER — OUTPATIENT (OUTPATIENT)
Dept: OUTPATIENT SERVICES | Facility: HOSPITAL | Age: 63
LOS: 1 days | End: 2023-11-29
Payer: MEDICARE

## 2023-11-29 DIAGNOSIS — Z96.651 PRESENCE OF RIGHT ARTIFICIAL KNEE JOINT: Chronic | ICD-10-CM

## 2023-11-29 DIAGNOSIS — Z98.89 OTHER SPECIFIED POSTPROCEDURAL STATES: Chronic | ICD-10-CM

## 2023-11-29 DIAGNOSIS — Z00.8 ENCOUNTER FOR OTHER GENERAL EXAMINATION: ICD-10-CM

## 2023-11-29 PROCEDURE — 73030 X-RAY EXAM OF SHOULDER: CPT

## 2023-11-29 PROCEDURE — 73030 X-RAY EXAM OF SHOULDER: CPT | Mod: 26,LT

## 2023-12-20 ENCOUNTER — NON-APPOINTMENT (OUTPATIENT)
Age: 63
End: 2023-12-20

## 2023-12-20 ENCOUNTER — APPOINTMENT (OUTPATIENT)
Dept: CARDIOLOGY | Facility: CLINIC | Age: 63
End: 2023-12-20
Payer: MEDICARE

## 2023-12-20 VITALS
OXYGEN SATURATION: 98 % | DIASTOLIC BLOOD PRESSURE: 72 MMHG | BODY MASS INDEX: 32.66 KG/M2 | HEIGHT: 61 IN | HEART RATE: 75 BPM | WEIGHT: 173 LBS | SYSTOLIC BLOOD PRESSURE: 142 MMHG

## 2023-12-20 DIAGNOSIS — I20.9 ANGINA PECTORIS, UNSPECIFIED: ICD-10-CM

## 2023-12-20 PROCEDURE — 93000 ELECTROCARDIOGRAM COMPLETE: CPT

## 2023-12-20 PROCEDURE — 99214 OFFICE O/P EST MOD 30 MIN: CPT

## 2023-12-20 NOTE — REVIEW OF SYSTEMS
[Fever] : no fever [Blurry Vision] : no blurred vision [Sore Throat] : no sore throat [SOB] : no shortness of breath [Dyspnea on exertion] : dyspnea during exertion [Chest Discomfort] : chest discomfort [Leg Claudication] : no intermittent leg claudication [Cough] : no cough

## 2023-12-20 NOTE — ASSESSMENT
[FreeTextEntry1] : Left-sided chest pain symptoms with prior history of mild coronary artery disease risk factors of hypertension hyperlipidemia type 2 diabetes.  I recommend we get a nuclear stress test to assess for ischemia.  Hypertension her blood pressure is elevated.  I recommend that continue taking current medication that includes hydrochlorothiazide 25 mg daily losartan 100 mg daily and atenolol 50 mg daily and recommend restricting salt intake and follow-up in the trends on the blood pressure on the next visit.  Patient has venous insufficiency and is bothered by her legs feeling heavy and swollen especially by the end of the day I recommended we set up consultation with vascular cardiology Dr. Fuentes

## 2023-12-20 NOTE — HISTORY OF PRESENT ILLNESS
[FreeTextEntry1] : 63-year-old female history of hypertension hyperlipidemia type 2 diabetes mild coronary artery disease based on cardiac catheterization in 2017.  Patient is here because she is concerned about her symptoms of left shoulder and left chest pain that her symptoms at rest other times brought on by activity and exertion.

## 2023-12-20 NOTE — PHYSICAL EXAM
[Well Developed] : well developed [Normal Conjunctiva] : normal conjunctiva [Normal Venous Pressure] : normal venous pressure [No Carotid Bruit] : no carotid bruit [Normal S1, S2] : normal S1, S2 [No Murmur] : no murmur

## 2024-01-03 ENCOUNTER — APPOINTMENT (OUTPATIENT)
Dept: CV DIAGNOSTICS | Facility: HOSPITAL | Age: 64
End: 2024-01-03

## 2024-01-03 ENCOUNTER — OUTPATIENT (OUTPATIENT)
Dept: OUTPATIENT SERVICES | Facility: HOSPITAL | Age: 64
LOS: 1 days | End: 2024-01-03
Payer: MEDICARE

## 2024-01-03 DIAGNOSIS — Z98.89 OTHER SPECIFIED POSTPROCEDURAL STATES: Chronic | ICD-10-CM

## 2024-01-03 DIAGNOSIS — R07.9 CHEST PAIN, UNSPECIFIED: ICD-10-CM

## 2024-01-03 DIAGNOSIS — Z96.651 PRESENCE OF RIGHT ARTIFICIAL KNEE JOINT: Chronic | ICD-10-CM

## 2024-01-03 PROCEDURE — 93018 CV STRESS TEST I&R ONLY: CPT | Mod: GC,MH

## 2024-01-03 PROCEDURE — 93016 CV STRESS TEST SUPVJ ONLY: CPT | Mod: GC,MH

## 2024-01-03 PROCEDURE — 78451 HT MUSCLE IMAGE SPECT SING: CPT | Mod: 26,MH

## 2024-01-30 ENCOUNTER — APPOINTMENT (OUTPATIENT)
Dept: ORTHOPEDIC SURGERY | Facility: CLINIC | Age: 64
End: 2024-01-30
Payer: MEDICARE

## 2024-01-30 ENCOUNTER — NON-APPOINTMENT (OUTPATIENT)
Age: 64
End: 2024-01-30

## 2024-01-30 VITALS
DIASTOLIC BLOOD PRESSURE: 85 MMHG | SYSTOLIC BLOOD PRESSURE: 144 MMHG | HEIGHT: 61 IN | HEART RATE: 61 BPM | WEIGHT: 175 LBS | BODY MASS INDEX: 33.04 KG/M2

## 2024-01-30 PROCEDURE — 73562 X-RAY EXAM OF KNEE 3: CPT | Mod: LT

## 2024-01-30 PROCEDURE — 99204 OFFICE O/P NEW MOD 45 MIN: CPT

## 2024-01-30 NOTE — DISCUSSION/SUMMARY
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 Based upon the patients continued symptoms and failure to respond to 3 months of conservative treatment I have recommended a Left Total Knee Replacement for this patient. A long discussion took place with the patient describing what a total joint replacement is and what the procedure would entail. A Total Knee model, similar to the implant that will be used during the operation was utilized to demonstrate and to discuss the various bearing surfaces of the implants.   The benefits of surgery were discussed with the patient including the potential for improving his/her current clinical condition through operative intervention. Alternatives to surgical intervention including continued conservative management were also discussed in detail.   The hospitalization and post-operative care and rehabilitation were also discussed. The use of perioperative antibiotics and DVT prophylaxis were discussed. The risk, benefits and alternatives to a surgical intervention were discussed at length with the patient. The patient was also advised of risks related to the medical comorbidities and elevated body mass index (BMI). A lengthy discussion took place to review the most common complications including but not limited to: deep vein thrombosis, pulmonary embolus, heart attack, stroke, infection, wound breakdown, numbness, damage to nerves, tendon, muscles, arteries or other blood vessels, death and other possible complications from anesthesia. The patient was told that we will take steps to minimize these risks by using sterile technique, antibiotics and DVT prophylaxis when appropriate and follow the patient postoperatively in the office setting to monitor progress. The possibility of recurrent pain, no improvement in pain and actual worsening of pain were also discussed with the patient.   The discharge plan of care focused on the patient going home following surgery. I encouraged the patient to make the necessary arrangements to have someone stay with them when they are discharged home. Following discharge, a home care nurse will visit the patient. He/she will open your home care case and request home physical therapy services. Home physical therapy will commence following discharge provided it is appropriate and covered by [his /her] health insurance benefit plan.   All questions were answered to the satisfaction of the patient. The treatment plan of care, as well as a model of a Total Knee equivalent to the one that will be used for their total joint replacement, was shared with the patient. The patient agreed to the plan of care as well as the use of implants in their total joint replacement.   The patient was advised that they will require a medical preoperative risk evaluation by their PCP. Further medical subspecialty clearances such as cardiac may be indicated if felt needed by their PCP.   The patient participated in an interactive discussion of the TKR implant planned for their surgery with questions answered, agreed with the treatment plan, and has decided to move forward with elective TKR as planned.   A DJO Knee implant is the implant I feel comfortable utilizing in my Primary TKRs and the implant I am planning for their TKR. If the patient wishes to utilize a different implant brand/type for their TKR, they may obtain an additional surgical opinion from a Surgeon utilizing that brand implant

## 2024-01-30 NOTE — PHYSICAL EXAM
[Antalgic] : antalgic [LE] : Sensory: Intact in bilateral lower extremities [DP] : dorsalis pedis 2+ and symmetric bilaterally [Normal RLE] : Right Lower Extremity: No scars, rashes, lesions, ulcers, skin intact [Normal Touch] : sensation intact for touch [Normal] : no peripheral adenopathy appreciated [de-identified] : On physical exam of the left knee skin is warm and dry without erythema ecchymosis signs or symptoms of infection superficial or deep.  There is tenderness to palpation along the medial joint line.  There is no palpable effusion.  Range of motion is 0-130 with pain at extremes of flexion.  The knee is stable with varus and valgus stress.  There is a negative anterior posterior drawer.  Sensation is intact throughout the distal lower extremity.  Strength is well-maintained in all planes.  There is negative Homans exam.  2+ dorsalis pedis pulse. [de-identified] : Radiographs of the left knee were performed today in the [place] office. Grade 4 bone-on-bone cartilaginous wear in all 3 compartments. Subchondral sclerosis noted on both sides the T-F  joint. Osteophytes are noted in all three compartments.

## 2024-01-30 NOTE — END OF VISIT
[FreeTextEntry3] : I Dr. Turcios, personally performed the evaluation and management services for this established patient who present today with a new problem/exacerbation of an existing condition. The E/M includes conducting the examination, assessing all new/exacerbated conditions, and establishing a new plan of care. Today, My ACP was here to assist in my evaluation and management services of this new problem/exacerbated condition to be followed going forward.

## 2024-01-30 NOTE — HISTORY OF PRESENT ILLNESS
[Worsening] : worsening [7] : a current pain level of 7/10 [Standing] : standing [Daily] : ~He/She~ states the symptoms seem to be occuring daily [Walking] : worsened by walking [Knee Flexion] : worsened with knee flexion [Knee Extension] : worsened with knee extension [NSAIDs] : relieved by nonsteroidal anti-inflammatory drugs [Rest] : relieved by rest [de-identified] : 63-year-old female presents with left knee pain has been ongoing for the last several years and continuing to worsen over time.  She states that she has had physical therapy as well as viscosupplementation in the past without any relief.  She states the pain is worse along the medial joint line as well as posteriorly.  Pain is now affecting her activities of daily living and she is no longer as active as she was in the past due to the pain.  She denies any history of injuries.  She would like to discuss her options in terms of surgical management. Pain is rated as a 7 out of ten without radiation. She is taking anti-inflammatories with some relief.  Denies fevers, chills, chest pain, calf pain, shortness of breath.

## 2024-03-04 ENCOUNTER — NON-APPOINTMENT (OUTPATIENT)
Age: 64
End: 2024-03-04

## 2024-03-04 ENCOUNTER — APPOINTMENT (OUTPATIENT)
Dept: CARDIOLOGY | Facility: CLINIC | Age: 64
End: 2024-03-04
Payer: MEDICARE

## 2024-03-04 VITALS
BODY MASS INDEX: 33.04 KG/M2 | DIASTOLIC BLOOD PRESSURE: 80 MMHG | HEIGHT: 61 IN | SYSTOLIC BLOOD PRESSURE: 153 MMHG | WEIGHT: 175 LBS | OXYGEN SATURATION: 97 % | HEART RATE: 77 BPM

## 2024-03-04 DIAGNOSIS — I87.2 VENOUS INSUFFICIENCY (CHRONIC) (PERIPHERAL): ICD-10-CM

## 2024-03-04 DIAGNOSIS — I25.10 ATHEROSCLEROTIC HEART DISEASE OF NATIVE CORONARY ARTERY W/OUT ANGINA PECTORIS: ICD-10-CM

## 2024-03-04 DIAGNOSIS — I10 ESSENTIAL (PRIMARY) HYPERTENSION: ICD-10-CM

## 2024-03-04 DIAGNOSIS — I78.1 NEVUS, NON-NEOPLASTIC: ICD-10-CM

## 2024-03-04 PROCEDURE — 99204 OFFICE O/P NEW MOD 45 MIN: CPT

## 2024-03-04 PROCEDURE — 93000 ELECTROCARDIOGRAM COMPLETE: CPT

## 2024-03-04 NOTE — REVIEW OF SYSTEMS
[Fever] : no fever [Sore Throat] : no sore throat [Blurry Vision] : no blurred vision [Dyspnea on exertion] : dyspnea during exertion [SOB] : no shortness of breath [Leg Claudication] : no intermittent leg claudication [Chest Discomfort] : chest discomfort [Cough] : no cough [Negative] : Heme/Lymph

## 2024-03-04 NOTE — ASSESSMENT
[FreeTextEntry1] : #CVI - Plan to obtain lower ext venous reflux study to assess for CVI - Pt advised to use compression stockings.   #HTN - BP slightly elevated today, however, pt states that her BP is usually normal when she checks at home.  - C/w current meds  #CAD - Stable C/w ASA and statin

## 2024-03-04 NOTE — PHYSICAL EXAM
[Well Nourished] : well nourished [Well Developed] : well developed [No Acute Distress] : no acute distress [Normal Conjunctiva] : normal conjunctiva [No Carotid Bruit] : no carotid bruit [Normal Venous Pressure] : normal venous pressure [Normal S1, S2] : normal S1, S2 [No Murmur] : no murmur [No Gallop] : no gallop [No Rub] : no rub [Clear Lung Fields] : clear lung fields [No Respiratory Distress] : no respiratory distress  [Good Air Entry] : good air entry [Non Tender] : non-tender [Soft] : abdomen soft [Normal Bowel Sounds] : normal bowel sounds [No Masses/organomegaly] : no masses/organomegaly [Normal Gait] : normal gait [No Cyanosis] : no cyanosis [No Clubbing] : no clubbing [No Rash] : no rash [No Skin Lesions] : no skin lesions [Moves all extremities] : moves all extremities [No Focal Deficits] : no focal deficits [Alert and Oriented] : alert and oriented [Normal Speech] : normal speech [Normal memory] : normal memory [de-identified] : 1+ edema spider veins.

## 2024-05-01 ENCOUNTER — APPOINTMENT (OUTPATIENT)
Dept: CV DIAGNOSITCS | Facility: HOSPITAL | Age: 64
End: 2024-05-01

## 2024-05-01 ENCOUNTER — RESULT REVIEW (OUTPATIENT)
Age: 64
End: 2024-05-01

## 2024-05-01 ENCOUNTER — OUTPATIENT (OUTPATIENT)
Dept: OUTPATIENT SERVICES | Facility: HOSPITAL | Age: 64
LOS: 1 days | End: 2024-05-01
Payer: MEDICARE

## 2024-05-01 DIAGNOSIS — I78.1 NEVUS, NON-NEOPLASTIC: ICD-10-CM

## 2024-05-01 DIAGNOSIS — Z98.89 OTHER SPECIFIED POSTPROCEDURAL STATES: Chronic | ICD-10-CM

## 2024-05-01 DIAGNOSIS — I87.2 VENOUS INSUFFICIENCY (CHRONIC) (PERIPHERAL): ICD-10-CM

## 2024-05-01 DIAGNOSIS — Z96.651 PRESENCE OF RIGHT ARTIFICIAL KNEE JOINT: Chronic | ICD-10-CM

## 2024-05-01 DIAGNOSIS — I10 ESSENTIAL (PRIMARY) HYPERTENSION: ICD-10-CM

## 2024-05-01 PROCEDURE — 93970 EXTREMITY STUDY: CPT | Mod: 26

## 2024-06-21 ENCOUNTER — APPOINTMENT (OUTPATIENT)
Dept: ULTRASOUND IMAGING | Facility: IMAGING CENTER | Age: 64
End: 2024-06-21
Payer: MEDICARE

## 2024-06-21 ENCOUNTER — APPOINTMENT (OUTPATIENT)
Dept: RADIOLOGY | Facility: IMAGING CENTER | Age: 64
End: 2024-06-21
Payer: MEDICARE

## 2024-06-21 ENCOUNTER — OUTPATIENT (OUTPATIENT)
Dept: OUTPATIENT SERVICES | Facility: HOSPITAL | Age: 64
LOS: 1 days | End: 2024-06-21
Payer: MEDICARE

## 2024-06-21 ENCOUNTER — APPOINTMENT (OUTPATIENT)
Dept: MAMMOGRAPHY | Facility: IMAGING CENTER | Age: 64
End: 2024-06-21
Payer: MEDICARE

## 2024-06-21 DIAGNOSIS — Z98.89 OTHER SPECIFIED POSTPROCEDURAL STATES: Chronic | ICD-10-CM

## 2024-06-21 DIAGNOSIS — I87.2 VENOUS INSUFFICIENCY (CHRONIC) (PERIPHERAL): ICD-10-CM

## 2024-06-21 DIAGNOSIS — Z96.651 PRESENCE OF RIGHT ARTIFICIAL KNEE JOINT: Chronic | ICD-10-CM

## 2024-06-21 DIAGNOSIS — I78.1 NEVUS, NON-NEOPLASTIC: ICD-10-CM

## 2024-06-21 DIAGNOSIS — I10 ESSENTIAL (PRIMARY) HYPERTENSION: ICD-10-CM

## 2024-06-21 PROCEDURE — 77080 DXA BONE DENSITY AXIAL: CPT | Mod: 26

## 2024-06-21 PROCEDURE — 77063 BREAST TOMOSYNTHESIS BI: CPT | Mod: 26

## 2024-06-21 PROCEDURE — 77067 SCR MAMMO BI INCL CAD: CPT | Mod: 26

## 2024-06-21 PROCEDURE — 77067 SCR MAMMO BI INCL CAD: CPT

## 2024-06-21 PROCEDURE — 76700 US EXAM ABDOM COMPLETE: CPT

## 2024-06-21 PROCEDURE — 76700 US EXAM ABDOM COMPLETE: CPT | Mod: 26

## 2024-06-21 PROCEDURE — 77080 DXA BONE DENSITY AXIAL: CPT

## 2024-06-21 PROCEDURE — 77063 BREAST TOMOSYNTHESIS BI: CPT

## 2024-06-22 ENCOUNTER — APPOINTMENT (OUTPATIENT)
Dept: ORTHOPEDIC SURGERY | Facility: CLINIC | Age: 64
End: 2024-06-22
Payer: MEDICARE

## 2024-06-22 VITALS — HEIGHT: 60 IN | WEIGHT: 172 LBS | BODY MASS INDEX: 33.77 KG/M2

## 2024-06-22 VITALS — SYSTOLIC BLOOD PRESSURE: 167 MMHG | HEART RATE: 69 BPM | DIASTOLIC BLOOD PRESSURE: 79 MMHG

## 2024-06-22 DIAGNOSIS — M17.12 UNILATERAL PRIMARY OSTEOARTHRITIS, LEFT KNEE: ICD-10-CM

## 2024-06-22 PROCEDURE — 99213 OFFICE O/P EST LOW 20 MIN: CPT

## 2024-06-22 PROCEDURE — 73562 X-RAY EXAM OF KNEE 3: CPT | Mod: LT

## 2024-06-22 NOTE — HISTORY OF PRESENT ILLNESS
[de-identified] : 64-year-old female presents with left knee pain has been ongoing for the last several years and continuing to worsen over time.  She states that she has had physical therapy as well as viscosupplementation in the past without any relief.  She states the pain is worse along the medial joint line as well as posteriorly.  Pain is now affecting her activities of daily living and she is no longer as active as she was in the past due to the pain.  She denies any history of injuries.  She would like to discuss her options in terms of surgical management. Pain is rated as a 7 out of ten without radiation. She is taking anti-inflammatories with some relief.  The patient has difficulty standing for long periods of time, walking for long periods of time, negotiating stairs.  We did see the patient last January and indicated for surgery.  Patient feels things have gotten progressively worse over the past 6 months. Denies fevers, chills, chest pain, calf pain, shortness of breath. [Worsening] : worsening [10] : a minimum pain level of 10/10 [Standing] : standing [Daily] : ~He/She~ states the symptoms seem to be occuring daily [Direct Pressure] : worsened by direct pressure [Running] : worsened by running [Walking] : worsened by walking [Knee Flexion] : worsened with knee flexion [Knee Extension] : worsened with knee extension [Exercise Regimen] : relieved by exercise regimen [Acetaminophen] : relieved by acetaminophen [Heat] : relieved by heat [Ice] : relieved by ice [NSAIDs] : relieved by nonsteroidal anti-inflammatory drugs [Physical Therapy] : relieved by physical therapy [Rest] : relieved by rest [Ataxia] : no ataxia [Incontinence] : no incontinence [Loss of Dexterity] : good dexterity [Urinary Ret.] : no urinary retention

## 2024-06-22 NOTE — PHYSICAL EXAM
[Antalgic] : antalgic [LE] : Sensory: Intact in bilateral lower extremities [DP] : dorsalis pedis 2+ and symmetric bilaterally [Normal RLE] : Right Lower Extremity: No scars, rashes, lesions, ulcers, skin intact [Normal Touch] : sensation intact for touch [Normal] : no peripheral adenopathy appreciated [de-identified] : Radiographs of the left knee were performed today in the [place] office. Grade 4 bone-on-bone cartilaginous wear in all 3 compartments. Subchondral sclerosis noted on both sides the T-F  joint. Osteophytes are noted in all three compartments.  [de-identified] : On physical exam of the left knee skin is warm and dry without erythema ecchymosis signs or symptoms of infection superficial or deep.  There is tenderness to palpation along the medial joint line.  There is no palpable effusion.  Range of motion is 0-130 with pain at extremes of flexion.  The knee is stable with varus and valgus stress.  There is a negative anterior posterior drawer.  Sensation is intact throughout the distal lower extremity.  Strength is well-maintained in all planes.  There is negative Homans exam.  2+ dorsalis pedis pulse.

## 2024-06-22 NOTE — DISCUSSION/SUMMARY
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 Based upon the patients continued symptoms and failure to respond to 3 months of conservative treatment I have recommended a Left Total Knee Replacement for this patient. A long discussion took place with the patient describing what a total joint replacement is and what the procedure would entail. A Total Knee model, similar to the implant that will be used during the operation was utilized to demonstrate and to discuss the various bearing surfaces of the implants.   The benefits of surgery were discussed with the patient including the potential for improving his/her current clinical condition through operative intervention. Alternatives to surgical intervention including continued conservative management were also discussed in detail.   The hospitalization and post-operative care and rehabilitation were also discussed. The use of perioperative antibiotics and DVT prophylaxis were discussed. The risk, benefits and alternatives to a surgical intervention were discussed at length with the patient. The patient was also advised of risks related to the medical comorbidities and elevated body mass index (BMI). A lengthy discussion took place to review the most common complications including but not limited to: deep vein thrombosis, pulmonary embolus, heart attack, stroke, infection, wound breakdown, numbness, damage to nerves, tendon, muscles, arteries or other blood vessels, death and other possible complications from anesthesia. The patient was told that we will take steps to minimize these risks by using sterile technique, antibiotics and DVT prophylaxis when appropriate and follow the patient postoperatively in the office setting to monitor progress. The possibility of recurrent pain, no improvement in pain and actual worsening of pain were also discussed with the patient.   The discharge plan of care focused on the patient going home following surgery. I encouraged the patient to make the necessary arrangements to have someone stay with them when they are discharged home. Following discharge, a home care nurse will visit the patient. He/she will open your home care case and request home physical therapy services. Home physical therapy will commence following discharge provided it is appropriate and covered by [his /her] health insurance benefit plan.   All questions were answered to the satisfaction of the patient. The treatment plan of care, as well as a model of a Total Knee equivalent to the one that will be used for their total joint replacement, was shared with the patient. The patient agreed to the plan of care as well as the use of implants in their total joint replacement.   The patient was advised that they will require a medical preoperative risk evaluation by their PCP. Further medical subspecialty clearances such as cardiac may be indicated if felt needed by their PCP.   The patient participated in an interactive discussion of the left TKR implant planned for their surgery with questions answered, agreed with the treatment plan, and has decided to move forward with elective leftTKR as planned.   A DJO Knee implant is the implant I feel comfortable utilizing in my Primary TKRs and the implant I am planning for their TKR. If the patient wishes to utilize a different implant brand/type for their TKR, they may obtain an additional surgical opinion from a Surgeon utilizing that brand implant

## 2024-06-24 ENCOUNTER — NON-APPOINTMENT (OUTPATIENT)
Age: 64
End: 2024-06-24

## 2024-06-26 ENCOUNTER — NON-APPOINTMENT (OUTPATIENT)
Age: 64
End: 2024-06-26

## 2024-07-05 ENCOUNTER — NON-APPOINTMENT (OUTPATIENT)
Age: 64
End: 2024-07-05

## 2024-07-05 ENCOUNTER — OUTPATIENT (OUTPATIENT)
Dept: OUTPATIENT SERVICES | Facility: HOSPITAL | Age: 64
LOS: 1 days | End: 2024-07-05
Payer: MEDICARE

## 2024-07-05 VITALS
HEART RATE: 64 BPM | RESPIRATION RATE: 14 BRPM | SYSTOLIC BLOOD PRESSURE: 140 MMHG | DIASTOLIC BLOOD PRESSURE: 78 MMHG | HEIGHT: 66 IN | WEIGHT: 169.98 LBS | OXYGEN SATURATION: 99 % | TEMPERATURE: 98 F

## 2024-07-05 DIAGNOSIS — Z96.651 PRESENCE OF RIGHT ARTIFICIAL KNEE JOINT: Chronic | ICD-10-CM

## 2024-07-05 DIAGNOSIS — Z01.818 ENCOUNTER FOR OTHER PREPROCEDURAL EXAMINATION: ICD-10-CM

## 2024-07-05 DIAGNOSIS — M17.12 UNILATERAL PRIMARY OSTEOARTHRITIS, LEFT KNEE: ICD-10-CM

## 2024-07-05 DIAGNOSIS — Z98.89 OTHER SPECIFIED POSTPROCEDURAL STATES: Chronic | ICD-10-CM

## 2024-07-05 PROBLEM — U07.1 COVID-19: Chronic | Status: INACTIVE | Noted: 2022-07-01 | Resolved: 2024-07-05

## 2024-07-05 LAB
A1C WITH ESTIMATED AVERAGE GLUCOSE RESULT: 6.8 % — HIGH (ref 4–5.6)
ALBUMIN SERPL ELPH-MCNC: 3.3 G/DL — SIGNIFICANT CHANGE UP (ref 3.3–5)
ALP SERPL-CCNC: 71 U/L — SIGNIFICANT CHANGE UP (ref 30–120)
ALT FLD-CCNC: 29 U/L — SIGNIFICANT CHANGE UP (ref 10–60)
ANION GAP SERPL CALC-SCNC: 8 MMOL/L — SIGNIFICANT CHANGE UP (ref 5–17)
APTT BLD: 31.5 SEC — SIGNIFICANT CHANGE UP (ref 24.5–35.6)
AST SERPL-CCNC: 18 U/L — SIGNIFICANT CHANGE UP (ref 10–40)
BILIRUB SERPL-MCNC: 0.2 MG/DL — SIGNIFICANT CHANGE UP (ref 0.2–1.2)
BLD GP AB SCN SERPL QL: SIGNIFICANT CHANGE UP
BUN SERPL-MCNC: 20 MG/DL — SIGNIFICANT CHANGE UP (ref 7–23)
CALCIUM SERPL-MCNC: 9.5 MG/DL — SIGNIFICANT CHANGE UP (ref 8.4–10.5)
CHLORIDE SERPL-SCNC: 102 MMOL/L — SIGNIFICANT CHANGE UP (ref 96–108)
CO2 SERPL-SCNC: 30 MMOL/L — SIGNIFICANT CHANGE UP (ref 22–31)
CREAT SERPL-MCNC: 0.82 MG/DL — SIGNIFICANT CHANGE UP (ref 0.5–1.3)
EGFR: 79 ML/MIN/1.73M2 — SIGNIFICANT CHANGE UP
ESTIMATED AVERAGE GLUCOSE: 148 MG/DL — HIGH (ref 68–114)
GLUCOSE SERPL-MCNC: 70 MG/DL — SIGNIFICANT CHANGE UP (ref 70–99)
HCT VFR BLD CALC: 35.9 % — SIGNIFICANT CHANGE UP (ref 34.5–45)
HGB BLD-MCNC: 11 G/DL — LOW (ref 11.5–15.5)
INR BLD: 1.3 RATIO — HIGH (ref 0.85–1.18)
MCHC RBC-ENTMCNC: 25.3 PG — LOW (ref 27–34)
MCHC RBC-ENTMCNC: 30.6 GM/DL — LOW (ref 32–36)
MCV RBC AUTO: 82.5 FL — SIGNIFICANT CHANGE UP (ref 80–100)
MRSA PCR RESULT.: SIGNIFICANT CHANGE UP
NRBC # BLD: 0 /100 WBCS — SIGNIFICANT CHANGE UP (ref 0–0)
PLATELET # BLD AUTO: 327 K/UL — SIGNIFICANT CHANGE UP (ref 150–400)
POTASSIUM SERPL-MCNC: 4 MMOL/L — SIGNIFICANT CHANGE UP (ref 3.5–5.3)
POTASSIUM SERPL-SCNC: 4 MMOL/L — SIGNIFICANT CHANGE UP (ref 3.5–5.3)
PROT SERPL-MCNC: 7.6 G/DL — SIGNIFICANT CHANGE UP (ref 6–8.3)
PROTHROM AB SERPL-ACNC: 14.4 SEC — HIGH (ref 9.5–13)
RBC # BLD: 4.35 M/UL — SIGNIFICANT CHANGE UP (ref 3.8–5.2)
RBC # FLD: 13.2 % — SIGNIFICANT CHANGE UP (ref 10.3–14.5)
S AUREUS DNA NOSE QL NAA+PROBE: SIGNIFICANT CHANGE UP
SODIUM SERPL-SCNC: 140 MMOL/L — SIGNIFICANT CHANGE UP (ref 135–145)
WBC # BLD: 9.72 K/UL — SIGNIFICANT CHANGE UP (ref 3.8–10.5)
WBC # FLD AUTO: 9.72 K/UL — SIGNIFICANT CHANGE UP (ref 3.8–10.5)

## 2024-07-05 PROCEDURE — 87640 STAPH A DNA AMP PROBE: CPT

## 2024-07-05 PROCEDURE — 86901 BLOOD TYPING SEROLOGIC RH(D): CPT

## 2024-07-05 PROCEDURE — 85730 THROMBOPLASTIN TIME PARTIAL: CPT

## 2024-07-05 PROCEDURE — 80053 COMPREHEN METABOLIC PANEL: CPT

## 2024-07-05 PROCEDURE — G0463: CPT

## 2024-07-05 PROCEDURE — 85027 COMPLETE CBC AUTOMATED: CPT

## 2024-07-05 PROCEDURE — 83036 HEMOGLOBIN GLYCOSYLATED A1C: CPT

## 2024-07-05 PROCEDURE — 86900 BLOOD TYPING SEROLOGIC ABO: CPT

## 2024-07-05 PROCEDURE — 36415 COLL VENOUS BLD VENIPUNCTURE: CPT

## 2024-07-05 PROCEDURE — 87641 MR-STAPH DNA AMP PROBE: CPT

## 2024-07-05 PROCEDURE — 86850 RBC ANTIBODY SCREEN: CPT

## 2024-07-05 PROCEDURE — 85610 PROTHROMBIN TIME: CPT

## 2024-07-09 LAB
INR PPP: 1.26 RATIO
PT BLD: 14.3 SEC

## 2024-07-10 ENCOUNTER — APPOINTMENT (OUTPATIENT)
Dept: CARDIOLOGY | Facility: CLINIC | Age: 64
End: 2024-07-10
Payer: MEDICARE

## 2024-07-10 ENCOUNTER — NON-APPOINTMENT (OUTPATIENT)
Age: 64
End: 2024-07-10

## 2024-07-10 VITALS — DIASTOLIC BLOOD PRESSURE: 76 MMHG | SYSTOLIC BLOOD PRESSURE: 150 MMHG

## 2024-07-10 VITALS
WEIGHT: 170 LBS | HEART RATE: 63 BPM | HEIGHT: 60 IN | SYSTOLIC BLOOD PRESSURE: 149 MMHG | DIASTOLIC BLOOD PRESSURE: 81 MMHG | OXYGEN SATURATION: 96 % | BODY MASS INDEX: 33.38 KG/M2

## 2024-07-10 DIAGNOSIS — E78.5 HYPERLIPIDEMIA, UNSPECIFIED: ICD-10-CM

## 2024-07-10 DIAGNOSIS — I10 ESSENTIAL (PRIMARY) HYPERTENSION: ICD-10-CM

## 2024-07-10 PROCEDURE — 99214 OFFICE O/P EST MOD 30 MIN: CPT

## 2024-07-10 PROCEDURE — 93000 ELECTROCARDIOGRAM COMPLETE: CPT

## 2024-07-17 PROBLEM — I25.10 ATHEROSCLEROTIC HEART DISEASE OF NATIVE CORONARY ARTERY WITHOUT ANGINA PECTORIS: Chronic | Status: ACTIVE | Noted: 2024-07-05

## 2024-07-17 PROBLEM — I87.2 VENOUS INSUFFICIENCY (CHRONIC) (PERIPHERAL): Chronic | Status: ACTIVE | Noted: 2024-07-05

## 2024-07-17 PROBLEM — K21.9 GASTRO-ESOPHAGEAL REFLUX DISEASE WITHOUT ESOPHAGITIS: Chronic | Status: ACTIVE | Noted: 2024-07-05

## 2024-07-17 PROBLEM — E11.9 TYPE 2 DIABETES MELLITUS WITHOUT COMPLICATIONS: Chronic | Status: ACTIVE | Noted: 2024-07-05

## 2024-07-18 ENCOUNTER — OUTPATIENT (OUTPATIENT)
Dept: OUTPATIENT SERVICES | Facility: HOSPITAL | Age: 64
LOS: 1 days | Discharge: HOME | End: 2024-07-18

## 2024-07-18 DIAGNOSIS — D68.52 PROTHROMBIN GENE MUTATION: ICD-10-CM

## 2024-07-18 DIAGNOSIS — Z96.651 PRESENCE OF RIGHT ARTIFICIAL KNEE JOINT: Chronic | ICD-10-CM

## 2024-07-19 ENCOUNTER — RESULT REVIEW (OUTPATIENT)
Age: 64
End: 2024-07-19

## 2024-07-19 ENCOUNTER — APPOINTMENT (OUTPATIENT)
Dept: HEMATOLOGY ONCOLOGY | Facility: CLINIC | Age: 64
End: 2024-07-19
Payer: MEDICARE

## 2024-07-19 VITALS
DIASTOLIC BLOOD PRESSURE: 78 MMHG | HEIGHT: 60.63 IN | SYSTOLIC BLOOD PRESSURE: 130 MMHG | TEMPERATURE: 97.9 F | RESPIRATION RATE: 16 BRPM | HEART RATE: 67 BPM | BODY MASS INDEX: 31.92 KG/M2 | WEIGHT: 166.89 LBS | OXYGEN SATURATION: 98 %

## 2024-07-19 DIAGNOSIS — D68.9 COAGULATION DEFECT, UNSPECIFIED: ICD-10-CM

## 2024-07-19 LAB
APTT BLD: 32.4 SEC
BASOPHILS # BLD AUTO: 0.03 K/UL — SIGNIFICANT CHANGE UP (ref 0–0.2)
BASOPHILS NFR BLD AUTO: 0.4 % — SIGNIFICANT CHANGE UP (ref 0–2)
EOSINOPHIL # BLD AUTO: 0.19 K/UL — SIGNIFICANT CHANGE UP (ref 0–0.5)
EOSINOPHIL NFR BLD AUTO: 2.2 % — SIGNIFICANT CHANGE UP (ref 0–6)
HCT VFR BLD CALC: 38.9 % — SIGNIFICANT CHANGE UP (ref 34.5–45)
HGB BLD-MCNC: 12.3 G/DL — SIGNIFICANT CHANGE UP (ref 11.5–15.5)
IMM GRANULOCYTES NFR BLD AUTO: 0.4 % — SIGNIFICANT CHANGE UP (ref 0–0.9)
INR PPP: 1.19 RATIO
LYMPHOCYTES # BLD AUTO: 2.52 K/UL — SIGNIFICANT CHANGE UP (ref 1–3.3)
LYMPHOCYTES # BLD AUTO: 29.5 % — SIGNIFICANT CHANGE UP (ref 13–44)
MCHC RBC-ENTMCNC: 25.9 PG — LOW (ref 27–34)
MCHC RBC-ENTMCNC: 31.6 G/DL — LOW (ref 32–36)
MCV RBC AUTO: 82.1 FL — SIGNIFICANT CHANGE UP (ref 80–100)
MONOCYTES # BLD AUTO: 0.45 K/UL — SIGNIFICANT CHANGE UP (ref 0–0.9)
MONOCYTES NFR BLD AUTO: 5.3 % — SIGNIFICANT CHANGE UP (ref 2–14)
NEUTROPHILS # BLD AUTO: 5.33 K/UL — SIGNIFICANT CHANGE UP (ref 1.8–7.4)
NEUTROPHILS NFR BLD AUTO: 62.2 % — SIGNIFICANT CHANGE UP (ref 43–77)
NRBC # BLD: 0 /100 WBCS — SIGNIFICANT CHANGE UP (ref 0–0)
PLATELET # BLD AUTO: 327 K/UL — SIGNIFICANT CHANGE UP (ref 150–400)
PT BLD: 13.4 SEC
RBC # BLD: 4.74 M/UL — SIGNIFICANT CHANGE UP (ref 3.8–5.2)
RBC # FLD: 13.4 % — SIGNIFICANT CHANGE UP (ref 10.3–14.5)
WBC # BLD: 8.55 K/UL — SIGNIFICANT CHANGE UP (ref 3.8–10.5)
WBC # FLD AUTO: 8.55 K/UL — SIGNIFICANT CHANGE UP (ref 3.8–10.5)

## 2024-07-19 PROCEDURE — 99205 OFFICE O/P NEW HI 60 MIN: CPT

## 2024-07-20 LAB — FERRITIN SERPL-MCNC: 34 NG/ML

## 2024-07-21 PROBLEM — D68.9 COAGULOPATHY: Status: ACTIVE | Noted: 2024-07-19

## 2024-07-22 ENCOUNTER — TRANSCRIPTION ENCOUNTER (OUTPATIENT)
Age: 64
End: 2024-07-22

## 2024-07-22 ENCOUNTER — APPOINTMENT (OUTPATIENT)
Dept: ORTHOPEDIC SURGERY | Facility: HOSPITAL | Age: 64
End: 2024-07-22

## 2024-07-22 LAB
FACT VII ACT/NOR PPP: 43 %
HGB A MFR BLD: 97.8 %
HGB A2 MFR BLD: 2.2 %
HGB FRACT BLD-IMP: NORMAL

## 2024-07-22 RX ORDER — INSULIN ASPART 100 [IU]/ML
10 INJECTION, SOLUTION INTRAVENOUS; SUBCUTANEOUS
Refills: 0 | DISCHARGE

## 2024-07-22 RX ORDER — HYDROCHLOROTHIAZIDE 25 MG
1 TABLET ORAL
Refills: 0 | DISCHARGE

## 2024-07-22 RX ORDER — ATENOLOL 50 MG/1
1 TABLET ORAL
Refills: 0 | DISCHARGE

## 2024-07-23 ENCOUNTER — TRANSCRIPTION ENCOUNTER (OUTPATIENT)
Age: 64
End: 2024-07-23

## 2024-07-23 RX ORDER — NAPROXEN SODIUM 550 MG
1 TABLET ORAL
Refills: 0 | DISCHARGE

## 2024-07-24 ENCOUNTER — TRANSCRIPTION ENCOUNTER (OUTPATIENT)
Age: 64
End: 2024-07-24

## 2024-07-26 DIAGNOSIS — Z96.652 PRESENCE OF LEFT ARTIFICIAL KNEE JOINT: ICD-10-CM

## 2024-07-26 RX ORDER — CYCLOBENZAPRINE HYDROCHLORIDE 10 MG/1
10 TABLET, FILM COATED ORAL
Qty: 30 | Refills: 0 | Status: ACTIVE | COMMUNITY
Start: 2024-07-26 | End: 1900-01-01

## 2024-07-29 ENCOUNTER — NON-APPOINTMENT (OUTPATIENT)
Age: 64
End: 2024-07-29

## 2024-07-30 ENCOUNTER — OUTPATIENT (OUTPATIENT)
Dept: OUTPATIENT SERVICES | Facility: HOSPITAL | Age: 64
LOS: 1 days | End: 2024-07-30
Payer: MEDICARE

## 2024-07-30 ENCOUNTER — APPOINTMENT (OUTPATIENT)
Dept: NEUROLOGY | Facility: CLINIC | Age: 64
End: 2024-07-30

## 2024-07-30 ENCOUNTER — APPOINTMENT (OUTPATIENT)
Dept: ULTRASOUND IMAGING | Facility: IMAGING CENTER | Age: 64
End: 2024-07-30

## 2024-07-30 DIAGNOSIS — Z96.652 PRESENCE OF LEFT ARTIFICIAL KNEE JOINT: ICD-10-CM

## 2024-07-30 DIAGNOSIS — Z96.651 PRESENCE OF RIGHT ARTIFICIAL KNEE JOINT: Chronic | ICD-10-CM

## 2024-07-30 PROCEDURE — 93971 EXTREMITY STUDY: CPT | Mod: 26,LT

## 2024-07-30 PROCEDURE — 93971 EXTREMITY STUDY: CPT

## 2024-07-31 RX ORDER — OXYCODONE 5 MG/1
5 TABLET ORAL
Qty: 42 | Refills: 0 | Status: ACTIVE | COMMUNITY
Start: 2024-07-31 | End: 1900-01-01

## 2024-08-05 ENCOUNTER — APPOINTMENT (OUTPATIENT)
Dept: ORTHOPEDIC SURGERY | Facility: CLINIC | Age: 64
End: 2024-08-05

## 2024-08-05 PROCEDURE — 73562 X-RAY EXAM OF KNEE 3: CPT | Mod: LT

## 2024-08-05 PROCEDURE — 99024 POSTOP FOLLOW-UP VISIT: CPT

## 2024-08-06 NOTE — HISTORY OF PRESENT ILLNESS
[___ Weeks Post Op] : [unfilled] weeks post op [6] : the patient reports pain that is 6/10 in severity [Clean/Dry/Intact] : clean, dry and intact [Healed] : healed [Neuro Intact] : an unremarkable neurological exam [Vascular Intact] : ~T peripheral vascular exam normal [Negative Philip's] : maneuvers demonstrated a negative Philip's sign [Doing Well] : is doing well [Excellent Pain Control] : has excellent pain control [No Sign of Infection] : is showing no signs of infection [Chills] : no chills [Constipation] : no constipation [Diarrhea] : no diarrhea [Dysuria] : no dysuria [Fever] : no fever [Nausea] : no nausea [Vomiting] : no vomiting [Erythema] : not erythematous [Discharge] : absent of discharge [Swelling] : not swollen [Dehiscence] : not dehisced [de-identified] : Status post left total knee replacement on 7/22/24 [de-identified] : Patient is a 64-year-old female who presents today for an evaluation of her left knee.  She is status post left total knee replacement on July 22, 2024.  Overall she states she has been doing fairly well and receiving at home physical therapy.  She states that the pain can be a 5 or 6 on a scale of 1-10.  With some stiffness.  She denies any history of injury or accident. [de-identified] : On physical examination of the left knee. Patient is status post left total knee replacement. There is a well healed surgical scar with no evidence of infection; superficial or deep. There is no redness, heat or discharge noted. There is some expected post op swelling with some evidence of resolving ecchymosis. Prineo tape is in place and properly secured. This was removed There is some mild diffuse pain and tenderness noted particularly along the anterior scar. The overall alignment is in neutral position. There is no evidence of ligamentous laxity. This was tested in anterior and posterior drawer tests as well as varus and valgus stress test. The patient has adequate range of motion as they are able to fully extend the knee in flexion to at least 90. This was achieved during active and passive range of motion. There is no evidence of any muscle atrophy or palpable defect. No evidence of any motor or sensory deficit. Patient has good distal pulses with no calf tenderness. Homen's test was negative. There is no evidence of impending compartment syndrome. Pt has excellent extension.  As the pt is able to fully extend the knee.  The pt is unable to fully flex the knee. Flexion is limited to approximately 105 degrees.  [de-identified] : X-rays of the left knee were taken today. This includes 3 views. The knee AP, lateral and sunrise views. They reveal a status post left total knee replacement. The hardware is in place and shows excellent alignment as well as fixation. There is no evidence of any fracture, dislocation or loosening of any hardware.  [de-identified] : Status post left total knee replacement on July 22, 2024 [de-identified] : Plan for the patient is to continue with the current management.  This includes ice packs, moist heat, leg elevation and pain medications as needed.  It is suggested that the pt continue with the physical therapy as well as at home exercises. A prescription was given to the patient.  This to include walking, isometric, range of motion and strengthening exercises.  The pt is also advised to continue with the anti-inflammatories to help with pain control and swelling. The pt is also encouraged to continue with the recommended blood thinners to help prevent a blood clot. These were originally prescribed when being discharged from the hospital. The patient is also advised to return back to the office in another 4 weeks for a reevaluation and x-rays.  However, if there is any increase in pain, redness, swelling, heat, discharge or fever. The patient is advised to notify the office, sooner.  35 minutes were spent, face to face, in direct consultation with the patient. This includes reviewing the natural history of their Dx., eliciting the history, performing an orthopedic exam, review of the x-ray findings, forming a differential Dx and discussing all treatment options. This Includes both surgical and non-surgical treatments. I also reviewed all the risks and benefits of non-operative & operative Tx options, future impact into orthopedic functions/problems, activity restrictions both at home and at work, and all follow up requirements.

## 2024-08-06 NOTE — END OF VISIT
[Time Spent: ___ minutes] : I have spent [unfilled] minutes of time on the encounter. [FreeTextEntry3] : I, Dr. Ami Turcios MD, personally performed the evaluation and management services for this established patient who presented today with a new problem/exacerbation of an existing condition. That E/M includes conducting the examination, assessing all new/exacerbated conditions, and establishing a new plan of care. Today, MY ACP was here to assist and observe with recording the history in my evaluation and management services of this new problem/exacerbated condition to be followed going forward.

## 2024-08-06 NOTE — HISTORY OF PRESENT ILLNESS
[___ Weeks Post Op] : [unfilled] weeks post op [6] : the patient reports pain that is 6/10 in severity [Clean/Dry/Intact] : clean, dry and intact [Healed] : healed [Neuro Intact] : an unremarkable neurological exam [Vascular Intact] : ~T peripheral vascular exam normal [Negative Philip's] : maneuvers demonstrated a negative Philip's sign [Doing Well] : is doing well [Excellent Pain Control] : has excellent pain control [No Sign of Infection] : is showing no signs of infection [Chills] : no chills [Constipation] : no constipation [Diarrhea] : no diarrhea [Dysuria] : no dysuria [Fever] : no fever [Nausea] : no nausea [Vomiting] : no vomiting [Erythema] : not erythematous [Discharge] : absent of discharge [Swelling] : not swollen [Dehiscence] : not dehisced [de-identified] : Status post left total knee replacement on 7/22/24 [de-identified] : Patient is a 64-year-old female who presents today for an evaluation of her left knee.  She is status post left total knee replacement on July 22, 2024.  Overall she states she has been doing fairly well and receiving at home physical therapy.  She states that the pain can be a 5 or 6 on a scale of 1-10.  With some stiffness.  She denies any history of injury or accident. [de-identified] : On physical examination of the left knee. Patient is status post left total knee replacement. There is a well healed surgical scar with no evidence of infection; superficial or deep. There is no redness, heat or discharge noted. There is some expected post op swelling with some evidence of resolving ecchymosis. Prineo tape is in place and properly secured. This was removed There is some mild diffuse pain and tenderness noted particularly along the anterior scar. The overall alignment is in neutral position. There is no evidence of ligamentous laxity. This was tested in anterior and posterior drawer tests as well as varus and valgus stress test. The patient has adequate range of motion as they are able to fully extend the knee in flexion to at least 90. This was achieved during active and passive range of motion. There is no evidence of any muscle atrophy or palpable defect. No evidence of any motor or sensory deficit. Patient has good distal pulses with no calf tenderness. Homen's test was negative. There is no evidence of impending compartment syndrome. Pt has excellent extension.  As the pt is able to fully extend the knee.  The pt is unable to fully flex the knee. Flexion is limited to approximately 105 degrees.  [de-identified] : X-rays of the left knee were taken today. This includes 3 views. The knee AP, lateral and sunrise views. They reveal a status post left total knee replacement. The hardware is in place and shows excellent alignment as well as fixation. There is no evidence of any fracture, dislocation or loosening of any hardware.  [de-identified] : Status post left total knee replacement on July 22, 2024 [de-identified] : Plan for the patient is to continue with the current management.  This includes ice packs, moist heat, leg elevation and pain medications as needed.  It is suggested that the pt continue with the physical therapy as well as at home exercises. A prescription was given to the patient.  This to include walking, isometric, range of motion and strengthening exercises.  The pt is also advised to continue with the anti-inflammatories to help with pain control and swelling. The pt is also encouraged to continue with the recommended blood thinners to help prevent a blood clot. These were originally prescribed when being discharged from the hospital. The patient is also advised to return back to the office in another 4 weeks for a reevaluation and x-rays.  However, if there is any increase in pain, redness, swelling, heat, discharge or fever. The patient is advised to notify the office, sooner.  35 minutes were spent, face to face, in direct consultation with the patient. This includes reviewing the natural history of their Dx., eliciting the history, performing an orthopedic exam, review of the x-ray findings, forming a differential Dx and discussing all treatment options. This Includes both surgical and non-surgical treatments. I also reviewed all the risks and benefits of non-operative & operative Tx options, future impact into orthopedic functions/problems, activity restrictions both at home and at work, and all follow up requirements.

## 2024-08-14 RX ORDER — OXYCODONE 5 MG/1
5 TABLET ORAL
Qty: 28 | Refills: 0 | Status: ACTIVE | COMMUNITY
Start: 2024-08-14 | End: 1900-01-01

## 2024-08-26 ENCOUNTER — TRANSCRIPTION ENCOUNTER (OUTPATIENT)
Age: 64
End: 2024-08-26

## 2024-08-26 RX ORDER — OXYCODONE 5 MG/1
5 TABLET ORAL
Qty: 30 | Refills: 0 | Status: ACTIVE | COMMUNITY
Start: 2024-08-26 | End: 1900-01-01

## 2024-09-16 ENCOUNTER — NON-APPOINTMENT (OUTPATIENT)
Age: 64
End: 2024-09-16

## 2024-09-17 ENCOUNTER — APPOINTMENT (OUTPATIENT)
Dept: ORTHOPEDIC SURGERY | Facility: CLINIC | Age: 64
End: 2024-09-17
Payer: MEDICARE

## 2024-09-17 VITALS — HEART RATE: 86 BPM | SYSTOLIC BLOOD PRESSURE: 134 MMHG | DIASTOLIC BLOOD PRESSURE: 80 MMHG

## 2024-09-17 PROCEDURE — 73562 X-RAY EXAM OF KNEE 3: CPT | Mod: LT

## 2024-09-17 PROCEDURE — 99024 POSTOP FOLLOW-UP VISIT: CPT

## 2024-09-18 NOTE — HISTORY OF PRESENT ILLNESS
[___ Months Post Op] : [unfilled] months post op [Chills] : no chills [Constipation] : no constipation [Diarrhea] : no diarrhea [Dysuria] : no dysuria [Fever] : no fever [Nausea] : no nausea [Vomiting] : no vomiting [Erythema] : not erythematous [Discharge] : absent of discharge [Swelling] : not swollen [Dehiscence] : not dehisced [de-identified] : S/P left TKR DOS.7/22/2024 [de-identified] : Patient is a 64-year-old female who presents today for an evaluation regarding her left knee.  She is status post left total knee replacement on July 22, 2024.  Overall she states she has been doing fairly well.  But does feel some pain with some stiffness.  She states that she does go to physical therapy as well as participates in an exercise program at home.  Currently she is on Tylenol or as well as oxycodones when needed.  Denies any history of new or recent injury. [de-identified] : On physical examination of the left knee.  Patient is status post left total knee replacement.  There is a well-healed surgical scar with no evidence of infection superficial or deep.  There is no redness, swelling, heat, discharge or ecchymosis noted.  There is no pain or tenderness on examination.  The patient has good range of motion both passively and actively. As they are able to fully extend the knee with good flexion.  Patient also has good strength with range of motion against resistance.  There is no evidence of ligamentous laxity.  As this was tested in anterior posterior drawer test as well as varus and valgus stress testing.  There is no evidence of muscle atrophy or palpable defect.  No evidence of any motor or sensory deficit.  Patient has good distal pulses with no calf tenderness. Pt has excellent extension.  As the pt is able to fully extend the knee.  The pt has excellent range of motion. As the patient is able to fully flex the knee to about 125+ degrees. [de-identified] : X-rays of the left knee were taken today. This includes 3 views. The knee AP, lateral anbd sunrise views. They reveal a status post left total knee replacement. The hardware is in place and shows excellent alignment as well as fixation. There is no evidence of any fracture, dislocation or loosening of any hardware.  [de-identified] : Status post left total knee replacement on July 22, 2024 [de-identified] : The patient was assured that they are progressing well post operatively. They were explained that the prosthesis is in perfect alignment, perfect placement and fixated well.  It was explained that they are progressing well and as expected. It is recommended that the pt continue with the current treatment plan. This includes an ongoing exercise program, ice/moist heat when needed and NSAID's when needed. It was explained that a good exercise routine will help with pain relief and improve the overall longevity of the prosthesis. The patient is advised to return to the office in another year or so for further evaluation and x-rays. However, if there is any increase in pain, redness, swelling, heat, discharge, fever, change in ambulation or pain. The patient is strongly advised to call the office sooner.  35 minutes were spent, face to face, in direct consultation with the patient. This includes reviewing the natural history of their Dx., eliciting the history, performing an orthopedic exam, review of the x-ray findings, forming a differential Dx and discussing all treatment options. This Includes both surgical and non-surgical treatments. I also reviewed all the risks and benefits of non-operative & operative Tx options, future impact into orthopedic functions/problems, activity restrictions both at home and at work, and all follow up requirements.

## 2024-09-18 NOTE — HISTORY OF PRESENT ILLNESS
[___ Months Post Op] : [unfilled] months post op [Chills] : no chills [Constipation] : no constipation [Diarrhea] : no diarrhea [Dysuria] : no dysuria [Fever] : no fever [Nausea] : no nausea [Vomiting] : no vomiting [Erythema] : not erythematous [Discharge] : absent of discharge [Swelling] : not swollen [Dehiscence] : not dehisced [de-identified] : S/P left TKR DOS.7/22/2024 [de-identified] : Patient is a 64-year-old female who presents today for an evaluation regarding her left knee.  She is status post left total knee replacement on July 22, 2024.  Overall she states she has been doing fairly well.  But does feel some pain with some stiffness.  She states that she does go to physical therapy as well as participates in an exercise program at home.  Currently she is on Tylenol or as well as oxycodones when needed.  Denies any history of new or recent injury. [de-identified] : X-rays of the left knee were taken today. This includes 3 views. The knee AP, lateral anbd sunrise views. They reveal a status post left total knee replacement. The hardware is in place and shows excellent alignment as well as fixation. There is no evidence of any fracture, dislocation or loosening of any hardware.  [de-identified] : On physical examination of the left knee.  Patient is status post left total knee replacement.  There is a well-healed surgical scar with no evidence of infection superficial or deep.  There is no redness, swelling, heat, discharge or ecchymosis noted.  There is no pain or tenderness on examination.  The patient has good range of motion both passively and actively. As they are able to fully extend the knee with good flexion.  Patient also has good strength with range of motion against resistance.  There is no evidence of ligamentous laxity.  As this was tested in anterior posterior drawer test as well as varus and valgus stress testing.  There is no evidence of muscle atrophy or palpable defect.  No evidence of any motor or sensory deficit.  Patient has good distal pulses with no calf tenderness. Pt has excellent extension.  As the pt is able to fully extend the knee.  The pt has excellent range of motion. As the patient is able to fully flex the knee to about 125+ degrees. [de-identified] : Status post left total knee replacement on July 22, 2024 [de-identified] : The patient was assured that they are progressing well post operatively. They were explained that the prosthesis is in perfect alignment, perfect placement and fixated well.  It was explained that they are progressing well and as expected. It is recommended that the pt continue with the current treatment plan. This includes an ongoing exercise program, ice/moist heat when needed and NSAID's when needed. It was explained that a good exercise routine will help with pain relief and improve the overall longevity of the prosthesis. The patient is advised to return to the office in another year or so for further evaluation and x-rays. However, if there is any increase in pain, redness, swelling, heat, discharge, fever, change in ambulation or pain. The patient is strongly advised to call the office sooner.  35 minutes were spent, face to face, in direct consultation with the patient. This includes reviewing the natural history of their Dx., eliciting the history, performing an orthopedic exam, review of the x-ray findings, forming a differential Dx and discussing all treatment options. This Includes both surgical and non-surgical treatments. I also reviewed all the risks and benefits of non-operative & operative Tx options, future impact into orthopedic functions/problems, activity restrictions both at home and at work, and all follow up requirements.

## 2024-09-19 RX ORDER — OXYCODONE 5 MG/1
5 TABLET ORAL
Qty: 21 | Refills: 0 | Status: ACTIVE | COMMUNITY
Start: 2024-09-12 | End: 1900-01-01

## 2024-10-11 RX ORDER — OXYCODONE 5 MG/1
5 TABLET ORAL
Qty: 30 | Refills: 0 | Status: ACTIVE | COMMUNITY
Start: 2024-10-11 | End: 1900-01-01

## 2024-10-29 ENCOUNTER — APPOINTMENT (OUTPATIENT)
Dept: ORTHOPEDIC SURGERY | Facility: CLINIC | Age: 64
End: 2024-10-29
Payer: MEDICARE

## 2024-10-29 DIAGNOSIS — Z96.652 PRESENCE OF LEFT ARTIFICIAL KNEE JOINT: ICD-10-CM

## 2024-10-29 PROCEDURE — 73562 X-RAY EXAM OF KNEE 3: CPT | Mod: LT

## 2024-10-29 PROCEDURE — 99213 OFFICE O/P EST LOW 20 MIN: CPT

## 2024-10-29 RX ORDER — TRAMADOL HYDROCHLORIDE 50 MG/1
50 TABLET, COATED ORAL EVERY 4 HOURS
Qty: 30 | Refills: 0 | Status: ACTIVE | COMMUNITY
Start: 2024-10-29 | End: 1900-01-01

## 2024-10-31 ENCOUNTER — NON-APPOINTMENT (OUTPATIENT)
Age: 64
End: 2024-10-31

## 2024-11-06 NOTE — CONSULT NOTE ADULT - REASON FOR ADMISSION
L sided chest pain
[facility]  Neurology  [address] [place] [address], [facility] [address]  [place] [address]  Phone: [phone]  Fax:   Follow Up Time: 7-10 Days

## 2024-12-10 NOTE — DISCUSSION/SUMMARY
Adult
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 \par \par The natural history and treatment of degenerative arthritis was discussed with the patient at length today. The spectrum of treatment including nonoperative modalities to surgical intervention was elucidated. Noninvasive and nonoperative treatment modalities include weight reduction, activity modification with low impact exercise,  as needed use of acetaminophen or anti-inflammatory medications if tolerated, glucosamine/chondroitin supplements, and physical therapy. Further treatments can include corticosteroid injection and the use of viscosupplementation with hyaluronic acid injections. Definitive surgical treatment can certainly include total joint arthroplasty also. The risks and benefits of each treatment options was discussed and all questions were answered.\par \par Patient will at some point require left TKR but wishes to hold off at this time. \par The patient was informed of the findings and recommended conservative management in the form of a home exercise program, activity modifications, stationary bicycling, swimming and weight loss program. She has been recommended refrain from high impact activity. A trial of Glucosamine and Chondroiten Sulphate was recommended.\par I have recommended Euflexxa injections to the left knee and the patient agreed to proceed, and the risks, benefits and side effects were discussed. She has paid out of pocket for the injection today\par She will follow up next week for the second injection out of three

## 2025-01-07 ENCOUNTER — APPOINTMENT (OUTPATIENT)
Dept: ORTHOPEDIC SURGERY | Facility: CLINIC | Age: 65
End: 2025-01-07
Payer: MEDICARE

## 2025-01-07 DIAGNOSIS — Z96.652 PRESENCE OF LEFT ARTIFICIAL KNEE JOINT: ICD-10-CM

## 2025-01-07 PROCEDURE — 73562 X-RAY EXAM OF KNEE 3: CPT | Mod: LT

## 2025-01-07 PROCEDURE — 99214 OFFICE O/P EST MOD 30 MIN: CPT

## 2025-01-21 RX ORDER — AMOXICILLIN 500 MG/1
500 CAPSULE ORAL
Qty: 12 | Refills: 2 | Status: ACTIVE | COMMUNITY
Start: 2025-01-21 | End: 1900-01-01

## 2025-03-07 ENCOUNTER — EMERGENCY (EMERGENCY)
Facility: HOSPITAL | Age: 65
LOS: 1 days | Discharge: HOME | End: 2025-03-07
Attending: EMERGENCY MEDICINE | Admitting: STUDENT IN AN ORGANIZED HEALTH CARE EDUCATION/TRAINING PROGRAM
Payer: MEDICARE

## 2025-03-07 VITALS
HEIGHT: 61 IN | DIASTOLIC BLOOD PRESSURE: 93 MMHG | SYSTOLIC BLOOD PRESSURE: 198 MMHG | WEIGHT: 154.1 LBS | HEART RATE: 83 BPM | TEMPERATURE: 98 F | RESPIRATION RATE: 18 BRPM | OXYGEN SATURATION: 98 %

## 2025-03-07 VITALS
HEART RATE: 88 BPM | RESPIRATION RATE: 17 BRPM | DIASTOLIC BLOOD PRESSURE: 77 MMHG | OXYGEN SATURATION: 100 % | TEMPERATURE: 98 F | SYSTOLIC BLOOD PRESSURE: 154 MMHG

## 2025-03-07 DIAGNOSIS — Z96.651 PRESENCE OF RIGHT ARTIFICIAL KNEE JOINT: Chronic | ICD-10-CM

## 2025-03-07 LAB
ALBUMIN SERPL ELPH-MCNC: 4.7 G/DL — SIGNIFICANT CHANGE UP (ref 3.3–5)
ALP SERPL-CCNC: 62 U/L — SIGNIFICANT CHANGE UP (ref 40–120)
ALT FLD-CCNC: 20 U/L — SIGNIFICANT CHANGE UP (ref 4–33)
ANION GAP SERPL CALC-SCNC: 16 MMOL/L — HIGH (ref 7–14)
APPEARANCE UR: CLEAR — SIGNIFICANT CHANGE UP
AST SERPL-CCNC: 25 U/L — SIGNIFICANT CHANGE UP (ref 4–32)
BACTERIA # UR AUTO: NEGATIVE /HPF — SIGNIFICANT CHANGE UP
BASOPHILS # BLD AUTO: 0.02 K/UL — SIGNIFICANT CHANGE UP (ref 0–0.2)
BASOPHILS NFR BLD AUTO: 0.2 % — SIGNIFICANT CHANGE UP (ref 0–2)
BILIRUB SERPL-MCNC: <0.2 MG/DL — SIGNIFICANT CHANGE UP (ref 0.2–1.2)
BILIRUB UR-MCNC: NEGATIVE — SIGNIFICANT CHANGE UP
BUN SERPL-MCNC: 19 MG/DL — SIGNIFICANT CHANGE UP (ref 7–23)
CALCIUM SERPL-MCNC: 9.8 MG/DL — SIGNIFICANT CHANGE UP (ref 8.4–10.5)
CAST: 1 /LPF — SIGNIFICANT CHANGE UP (ref 0–4)
CHLORIDE SERPL-SCNC: 93 MMOL/L — LOW (ref 98–107)
CO2 SERPL-SCNC: 22 MMOL/L — SIGNIFICANT CHANGE UP (ref 22–31)
COLOR SPEC: YELLOW — SIGNIFICANT CHANGE UP
CREAT SERPL-MCNC: 0.7 MG/DL — SIGNIFICANT CHANGE UP (ref 0.5–1.3)
DIFF PNL FLD: ABNORMAL
EGFR: 95 ML/MIN/1.73M2 — SIGNIFICANT CHANGE UP
EOSINOPHIL # BLD AUTO: 0.12 K/UL — SIGNIFICANT CHANGE UP (ref 0–0.5)
EOSINOPHIL NFR BLD AUTO: 1.2 % — SIGNIFICANT CHANGE UP (ref 0–6)
FLUAV AG NPH QL: SIGNIFICANT CHANGE UP
FLUBV AG NPH QL: SIGNIFICANT CHANGE UP
GLUCOSE SERPL-MCNC: 128 MG/DL — HIGH (ref 70–99)
GLUCOSE UR QL: NEGATIVE MG/DL — SIGNIFICANT CHANGE UP
HCT VFR BLD CALC: 36 % — SIGNIFICANT CHANGE UP (ref 34.5–45)
HGB BLD-MCNC: 11.7 G/DL — SIGNIFICANT CHANGE UP (ref 11.5–15.5)
IANC: 7.36 K/UL — SIGNIFICANT CHANGE UP (ref 1.8–7.4)
IMM GRANULOCYTES NFR BLD AUTO: 0.3 % — SIGNIFICANT CHANGE UP (ref 0–0.9)
KETONES UR-MCNC: ABNORMAL MG/DL
LEUKOCYTE ESTERASE UR-ACNC: NEGATIVE — SIGNIFICANT CHANGE UP
LYMPHOCYTES # BLD AUTO: 2.04 K/UL — SIGNIFICANT CHANGE UP (ref 1–3.3)
LYMPHOCYTES # BLD AUTO: 20.4 % — SIGNIFICANT CHANGE UP (ref 13–44)
MAGNESIUM SERPL-MCNC: 1.5 MG/DL — LOW (ref 1.6–2.6)
MCHC RBC-ENTMCNC: 26.8 PG — LOW (ref 27–34)
MCHC RBC-ENTMCNC: 32.5 G/DL — SIGNIFICANT CHANGE UP (ref 32–36)
MCV RBC AUTO: 82.6 FL — SIGNIFICANT CHANGE UP (ref 80–100)
MONOCYTES # BLD AUTO: 0.43 K/UL — SIGNIFICANT CHANGE UP (ref 0–0.9)
MONOCYTES NFR BLD AUTO: 4.3 % — SIGNIFICANT CHANGE UP (ref 2–14)
NEUTROPHILS # BLD AUTO: 7.36 K/UL — SIGNIFICANT CHANGE UP (ref 1.8–7.4)
NEUTROPHILS NFR BLD AUTO: 73.6 % — SIGNIFICANT CHANGE UP (ref 43–77)
NITRITE UR-MCNC: NEGATIVE — SIGNIFICANT CHANGE UP
NRBC # BLD AUTO: 0 K/UL — SIGNIFICANT CHANGE UP (ref 0–0)
NRBC # FLD: 0 K/UL — SIGNIFICANT CHANGE UP (ref 0–0)
NRBC BLD AUTO-RTO: 0 /100 WBCS — SIGNIFICANT CHANGE UP (ref 0–0)
NT-PROBNP SERPL-SCNC: 140 PG/ML — SIGNIFICANT CHANGE UP
PH UR: 6 — SIGNIFICANT CHANGE UP (ref 5–8)
PHOSPHATE SERPL-MCNC: 3.2 MG/DL — SIGNIFICANT CHANGE UP (ref 2.5–4.5)
PLATELET # BLD AUTO: 349 K/UL — SIGNIFICANT CHANGE UP (ref 150–400)
POTASSIUM SERPL-MCNC: 4 MMOL/L — SIGNIFICANT CHANGE UP (ref 3.5–5.3)
POTASSIUM SERPL-SCNC: 4 MMOL/L — SIGNIFICANT CHANGE UP (ref 3.5–5.3)
PROT SERPL-MCNC: 7.7 G/DL — SIGNIFICANT CHANGE UP (ref 6–8.3)
PROT UR-MCNC: NEGATIVE MG/DL — SIGNIFICANT CHANGE UP
RBC # BLD: 4.36 M/UL — SIGNIFICANT CHANGE UP (ref 3.8–5.2)
RBC # FLD: 13.3 % — SIGNIFICANT CHANGE UP (ref 10.3–14.5)
RBC CASTS # UR COMP ASSIST: 0 /HPF — SIGNIFICANT CHANGE UP (ref 0–4)
RSV RNA NPH QL NAA+NON-PROBE: SIGNIFICANT CHANGE UP
SARS-COV-2 RNA SPEC QL NAA+PROBE: SIGNIFICANT CHANGE UP
SODIUM SERPL-SCNC: 131 MMOL/L — LOW (ref 135–145)
SP GR SPEC: 1.01 — SIGNIFICANT CHANGE UP (ref 1–1.03)
SQUAMOUS # UR AUTO: 1 /HPF — SIGNIFICANT CHANGE UP (ref 0–5)
TROPONIN T, HIGH SENSITIVITY RESULT: 7 NG/L — SIGNIFICANT CHANGE UP
TROPONIN T, HIGH SENSITIVITY RESULT: 8 NG/L — SIGNIFICANT CHANGE UP
UROBILINOGEN FLD QL: 0.2 MG/DL — SIGNIFICANT CHANGE UP (ref 0.2–1)
WBC # BLD: 10 K/UL — SIGNIFICANT CHANGE UP (ref 3.8–10.5)
WBC # FLD AUTO: 10 K/UL — SIGNIFICANT CHANGE UP (ref 3.8–10.5)
WBC UR QL: 0 /HPF — SIGNIFICANT CHANGE UP (ref 0–5)

## 2025-03-07 PROCEDURE — 71045 X-RAY EXAM CHEST 1 VIEW: CPT | Mod: 26

## 2025-03-07 PROCEDURE — 99285 EMERGENCY DEPT VISIT HI MDM: CPT

## 2025-03-07 PROCEDURE — 70498 CT ANGIOGRAPHY NECK: CPT | Mod: 26

## 2025-03-07 PROCEDURE — 70496 CT ANGIOGRAPHY HEAD: CPT | Mod: 26

## 2025-03-07 PROCEDURE — 93010 ELECTROCARDIOGRAM REPORT: CPT

## 2025-03-07 RX ORDER — ACETAMINOPHEN 500 MG/5ML
1000 LIQUID (ML) ORAL ONCE
Refills: 0 | Status: COMPLETED | OUTPATIENT
Start: 2025-03-07 | End: 2025-03-07

## 2025-03-07 RX ORDER — MAGNESIUM SULFATE 500 MG/ML
2 SYRINGE (ML) INJECTION ONCE
Refills: 0 | Status: COMPLETED | OUTPATIENT
Start: 2025-03-07 | End: 2025-03-07

## 2025-03-07 RX ORDER — MAGNESIUM, ALUMINUM HYDROXIDE 200-200 MG
30 TABLET,CHEWABLE ORAL ONCE
Refills: 0 | Status: COMPLETED | OUTPATIENT
Start: 2025-03-07 | End: 2025-03-07

## 2025-03-07 RX ORDER — LOSARTAN POTASSIUM 100 MG/1
1 TABLET, FILM COATED ORAL
Qty: 14 | Refills: 0
Start: 2025-03-07 | End: 2025-03-20

## 2025-03-07 RX ORDER — AMLODIPINE BESYLATE 10 MG/1
1 TABLET ORAL
Qty: 14 | Refills: 0
Start: 2025-03-07 | End: 2025-03-20

## 2025-03-07 RX ORDER — METOCLOPRAMIDE HCL 10 MG
10 TABLET ORAL ONCE
Refills: 0 | Status: COMPLETED | OUTPATIENT
Start: 2025-03-07 | End: 2025-03-07

## 2025-03-07 RX ADMIN — Medication 400 MILLIGRAM(S): at 17:22

## 2025-03-07 RX ADMIN — Medication 20 MILLIGRAM(S): at 17:21

## 2025-03-07 RX ADMIN — Medication 30 MILLILITER(S): at 17:21

## 2025-03-07 RX ADMIN — Medication 25 GRAM(S): at 17:50

## 2025-03-07 RX ADMIN — Medication 10 MILLIGRAM(S): at 20:26

## 2025-03-07 RX ADMIN — Medication 1000 MILLIGRAM(S): at 18:00

## 2025-03-07 NOTE — ED ADULT NURSE NOTE - OBJECTIVE STATEMENT
a&ox4, respirations even and unlabored. well appearing, c/o chest pain and headache with uneasy feeling, noted elevated bp. denies sob. oob, steady on her feet, no s/s of distress noted

## 2025-03-07 NOTE — ED PROVIDER NOTE - ATTENDING CONTRIBUTION TO CARE
64yoF with a history of HTN, DM2, hypothyroidism, and cerebral aneurysm who presents to the ER for evaluation of chest pain.  Patient reports she developed left-sided chest pain 4 days ago.  She also has a headache, some indigestion, and mild L lip tingling sensation, mild lightheadedness with ambulation. No shortness of breath, vision changes, balance issues. She saw her PCP who recommended cardiology follow-up for which she has an appointment for in May.  She also had left-sided leg pain for which she had sonogram that was negative at that time 2.  Not having pain anymore.  Pt also noting blood pressure has been elevated lately--ranging from 150-200s systolic.  She is taking her medications as prescribed.  Of note, patient reports cerebral aneurysm behind left ear that she had angiography done 2 years ago and was told that it is not concerning.  She has no other concerns at this time.

## 2025-03-07 NOTE — ED PROVIDER NOTE - PROGRESS NOTE DETAILS
(Emelyn Ortiz MD-PGY1): Pt labs reviewed. No acute abnormalities to keep patient in hospital. Na 131 and Mg 1.5. Possible related to HCTZ. Her headache is improved. Her BP improved. Will DC with outpt follow up for her BP management and medications. Started on magnesium for replacement. Patient able to discharge home with outpt follow up for BP management and cards. Will provide our cards and PCP urgently as pt not able to get in soon with hers. DO [place], EM Attending: Patient was pending discharge from Dr. Vernon, pt was still experiencing sx and resident requested assistance w/ reassessment. Pt still symptomatic. has hx of an aneurysm, HA x 4 d w/ L sided facial paresthesias. given this would get CTA head and neck. will get repeat troponin prior to dc. on review of lab work pt was hyponatremic. recently started on HCTZ. would discontinue this medication as it can cause hyponatremia.     s/w Dr. Chatterjee the pt's PMD to discuss BP management. Pt will be discontinued on the HCTZ, he recommends starting amlodipine 5 mg daily and wants the pt to follow up with him on Monday. I will send the losartan single medication and the amlodipine to her pharmacy. pt to c/w atenolol as well. (Emelyn Ortiz MD-PGY1): CTA negative and stable aneurysm. Will dc with outpt follow up as above progress note indicates.

## 2025-03-07 NOTE — ED PROVIDER NOTE - NSPTACCESSSVCSAPPTDETAILS_ED_ALL_ED_FT
Problem: Adult Inpatient Plan of Care  Goal: Plan of Care Review  Outcome: Improving   Plan for C.I.W.A. monitoring. She has been calm and cooperative, ambulated to bathroom without issues. Score is 0. Ate a very adequate breakfast. No c/o nausea.  Problem: Adult Inpatient Plan of Care  Goal: Optimal Comfort and Wellbeing  Outcome: Improving   Hgb. Is low. She states that is normal for her and she is menstruating at this time.   1. Cards: Within 1-2 weeks  2. PCP: Within 1-2 weeks 1. Cards: Within 1-2 weeks

## 2025-03-07 NOTE — ED PROVIDER NOTE - CLINICAL SUMMARY MEDICAL DECISION MAKING FREE TEXT BOX
(Emelyn Ortiz MD-PGY1): 64yoF with a history of HTN, DM2, hypothyroidism, and cerebral aneurysm who presents to the ER for evaluation of chest pain.  Patient reports she developed left-sided chest pain 4 days ago.  She also has a headache, some indigestion, and mild L lip tingling sensation, mild lightheadedness with ambulation. No shortness of breath, vision changes, balance issues. She saw her PCP who recommended cardiology follow-up for which she has an appointment for in May.  She also had left-sided leg pain for which she had sonogram that was negative at that time 2.  Not having pain anymore.  Pt also noting blood pressure has been elevated lately--ranging from 150-200s systolic.  She is taking her medications as prescribed.  Of note, patient reports cerebral aneurysm behind left ear that she had angiography done 2 years ago and was told that it is not concerning.  She has no other concerns at this time.    Vitals on arrival notable for elevated blood pressure of 198/93, not tachycardic, afebrile, normal oxygen saturation.  Physical exam was reassuring and nonactionable--RRR, clear lungs, nontender abdomen, normal gait, normal sensation/motor, nonfocal neuro, no edema, legs are symmetric.  DDx: ACS, gas, hypertensive urgency. Possible infectious, electrolyte, metabolic etiologies. Less concern for dissection. Will plan for basic labs with ACS workup, bnp, swab, CXR, tele, EKG. Given famotidine,   mylanta, tylenol for symptoms. Pt amenable. Updates in progress note section.

## 2025-03-07 NOTE — ED PROVIDER NOTE - NSICDXPASTSURGICALHX_GEN_ALL_CORE_FT
PAST SURGICAL HISTORY:  Knee Replacement partial knee replacement 2009 on R    S/P total knee replacement, right     Tubal Ligation Status 1989

## 2025-03-07 NOTE — ED PROVIDER NOTE - PATIENT PORTAL LINK FT
You can access the FollowMyHealth Patient Portal offered by [facility] by registering at the following website: [email or web address]. By joining [facility]’s FollowMyHealth portal, you will also be able to view your health information using other applications (apps) compatible with our system.

## 2025-03-07 NOTE — ED ADULT TRIAGE NOTE - CHIEF COMPLAINT QUOTE
patient states " I am having pain to my chest since 4 days ". h/o HTN, DM.  patient states " I am having pain to my chest since 4 days ".  patient returned to triage chair states I also have some tingling and funny feeling to left side of my lip for 4 days.  h/o HTN, DM. .

## 2025-03-07 NOTE — ED PROVIDER NOTE - NSFOLLOWUPINSTRUCTIONS_ED_ALL_ED_FT
SUMMARY  You were seen in the ER on 3/7/25 for evaluation of chest pain and high blood pressure. We did testing and believe you are safe to go home. You do have some electrolyte abnormalities that may be due to your blood pressure medication. You should call your doctor for a follow up as soon as possible.  Please follow the recommendations below. Thank you for allowing us to care for you!    RECOMMENDATIONS  - ***  - You need to call your doctor or clinic tomorrow or Monday for an appointment for blood pressure and electrolytes. Your doctor should be able to help manage those medications and may make changes.   - Someone here will call you Monday about cardiology and primary care, or you can try [phone]    SCHEDULE APPOINTMENT WITH  1. Your regular doctor for blood pressure management and electrolytes  2. Cardiology for any chest pain workup you may need or to assist with blood pressure medications    RETURN TO THE ER...  For any worsening symptoms or concerns, including chest pain, shortness of breath, lightheadedness, weakness, fever, inability to walk, severe pain, or anything else concerning. SUMMARY  You were seen in the ER on 3/7/25 for evaluation of chest pain and high blood pressure. We did testing and believe you are safe to go home. You do have some electrolyte abnormalities that may be due to your blood pressure medication. You should call your doctor for a follow up as soon as possible.  Please follow the recommendations below. Thank you for allowing us to care for you!    RECOMMENDATIONS  -****  - STOP taking your combined hydrochlorothiazide/losartan  - Instead TAKE 5 mg of amlodipine daily and 100 mg of losartan daily  - You need to call your doctor or clinic tomorrow or Monday for an appointment for blood pressure and electrolytes. Your doctor should be able to help manage those medications and may make changes.   - Someone here will call you Monday about cardiology and primary care, or you can try [phone]    SCHEDULE APPOINTMENT WITH  1. Your regular doctor for blood pressure management and electrolytes  2. Cardiology for any chest pain workup you may need or to assist with blood pressure medications    RETURN TO THE ER...  For any worsening symptoms or concerns, including chest pain, shortness of breath, lightheadedness, weakness, fever, inability to walk, severe pain, or anything else concerning. SUMMARY  You were seen in the ER on 3/7/25 for evaluation of chest pain and high blood pressure. We did testing and believe you are safe to go home. Your CT of the brain and neck show stable aneurysm, which means it did not change since 2023 and is not causing you problems right now. You do have some electrolyte abnormalities that may be due to your blood pressure medication. You should go to your doctor on Monday at the clinic for follow up. They are expecting you. Please follow the recommendations below. Thank you for allowing us to care for you!    RECOMMENDATIONS  Blood Pressure Instructions  1. STOP taking your combined Losartan / HCTZ  2. TAKE AND GET FROM PHARMACY Amlodipine 5mg daily, Losartan 100mg daily  3. Continue Atenolol the way you normally take it    SCHEDULE APPOINTMENT WITH  1. Your regular doctor on Monday morning for appointment for blood pressure and electrolytes  2. Cardiology : Someone will call to help you schedule sometime next week    RETURN TO THE ER...  For any worsening symptoms or concerns, including chest pain, shortness of breath, lightheadedness, weakness, fever, inability to walk, severe pain, or anything else concerning.

## 2025-03-07 NOTE — ED ADULT NURSE REASSESSMENT NOTE - NS ED NURSE REASSESS COMMENT FT1
Pt has no complaints at this time. Respirations even and unlabored. VS as noted. Awaiting CTr. Safety maintained.

## 2025-03-07 NOTE — ED ADULT NURSE NOTE - CHIEF COMPLAINT QUOTE
patient states " I am having pain to my chest since 4 days ".  patient returned to triage chair states I also have some tingling and funny feeling to left side of my lip for 4 days.  h/o HTN, DM. .

## 2025-03-07 NOTE — ED PROVIDER NOTE - NSICDXPASTMEDICALHX_GEN_ALL_CORE_FT
PAST MEDICAL HISTORY:  Asthma     CAD (coronary artery disease)     Cerebral aneurysm, nonruptured     COVID-19 vaccine series completed     GERD (gastroesophageal reflux disease)     HTN (Hypertension)     Hypercholesterolemia     Hypothyroidism     PVD (Peripheral Vascular Disease)     Type 2 diabetes mellitus     Venous insufficiency

## 2025-03-19 ENCOUNTER — APPOINTMENT (OUTPATIENT)
Dept: CARDIOLOGY | Facility: CLINIC | Age: 65
End: 2025-03-19
Payer: MEDICARE

## 2025-03-19 ENCOUNTER — NON-APPOINTMENT (OUTPATIENT)
Age: 65
End: 2025-03-19

## 2025-03-19 VITALS — SYSTOLIC BLOOD PRESSURE: 132 MMHG | DIASTOLIC BLOOD PRESSURE: 70 MMHG

## 2025-03-19 VITALS
HEART RATE: 69 BPM | BODY MASS INDEX: 29.84 KG/M2 | OXYGEN SATURATION: 99 % | TEMPERATURE: 98.1 F | RESPIRATION RATE: 16 BRPM | WEIGHT: 152 LBS | SYSTOLIC BLOOD PRESSURE: 145 MMHG | HEIGHT: 60 IN | DIASTOLIC BLOOD PRESSURE: 73 MMHG

## 2025-03-19 DIAGNOSIS — I10 ESSENTIAL (PRIMARY) HYPERTENSION: ICD-10-CM

## 2025-03-19 PROCEDURE — 93000 ELECTROCARDIOGRAM COMPLETE: CPT

## 2025-03-19 PROCEDURE — 99213 OFFICE O/P EST LOW 20 MIN: CPT

## 2025-03-19 RX ORDER — AMLODIPINE BESYLATE 5 MG/1
5 TABLET ORAL DAILY
Qty: 90 | Refills: 3 | Status: ACTIVE | COMMUNITY
Start: 2025-03-19 | End: 1900-01-01

## 2025-05-07 ENCOUNTER — APPOINTMENT (OUTPATIENT)
Dept: CARDIOLOGY | Facility: CLINIC | Age: 65
End: 2025-05-07

## 2025-06-27 ENCOUNTER — NON-APPOINTMENT (OUTPATIENT)
Age: 65
End: 2025-06-27

## 2025-09-17 ENCOUNTER — APPOINTMENT (OUTPATIENT)
Dept: CARDIOLOGY | Facility: CLINIC | Age: 65
End: 2025-09-17
Payer: MEDICARE

## 2025-09-17 ENCOUNTER — NON-APPOINTMENT (OUTPATIENT)
Age: 65
End: 2025-09-17

## 2025-09-17 VITALS
HEART RATE: 73 BPM | RESPIRATION RATE: 16 BRPM | WEIGHT: 153 LBS | OXYGEN SATURATION: 98 % | SYSTOLIC BLOOD PRESSURE: 131 MMHG | TEMPERATURE: 97.9 F | DIASTOLIC BLOOD PRESSURE: 72 MMHG | BODY MASS INDEX: 29.88 KG/M2

## 2025-09-17 DIAGNOSIS — I10 ESSENTIAL (PRIMARY) HYPERTENSION: ICD-10-CM

## 2025-09-17 DIAGNOSIS — I25.10 ATHEROSCLEROTIC HEART DISEASE OF NATIVE CORONARY ARTERY W/OUT ANGINA PECTORIS: ICD-10-CM

## 2025-09-17 DIAGNOSIS — E78.5 HYPERLIPIDEMIA, UNSPECIFIED: ICD-10-CM

## 2025-09-17 PROCEDURE — 99214 OFFICE O/P EST MOD 30 MIN: CPT

## 2025-09-17 PROCEDURE — 93000 ELECTROCARDIOGRAM COMPLETE: CPT
